# Patient Record
Sex: FEMALE | Race: WHITE | NOT HISPANIC OR LATINO | Employment: FULL TIME | URBAN - NONMETROPOLITAN AREA
[De-identification: names, ages, dates, MRNs, and addresses within clinical notes are randomized per-mention and may not be internally consistent; named-entity substitution may affect disease eponyms.]

---

## 2017-01-24 ENCOUNTER — PROCEDURE VISIT (OUTPATIENT)
Dept: OBSTETRICS AND GYNECOLOGY | Facility: CLINIC | Age: 22
End: 2017-01-24

## 2017-01-24 VITALS
DIASTOLIC BLOOD PRESSURE: 82 MMHG | RESPIRATION RATE: 18 BRPM | BODY MASS INDEX: 47.09 KG/M2 | WEIGHT: 293 LBS | SYSTOLIC BLOOD PRESSURE: 140 MMHG | HEIGHT: 66 IN | HEART RATE: 79 BPM

## 2017-01-24 DIAGNOSIS — Z30.430 ENCOUNTER FOR IUD INSERTION: Primary | ICD-10-CM

## 2017-01-24 PROCEDURE — 58300 INSERT INTRAUTERINE DEVICE: CPT | Performed by: NURSE PRACTITIONER

## 2017-01-24 NOTE — MR AVS SNAPSHOT
Elysia Pike   2017 11:30 AM   Procedure visit    Dept Phone:  [phone]   Encounter #:  [id number]    Provider:  BHARAT Kennedy   Department:  [facility] [place]                Your Full Care Plan              Today's Medication Changes          These changes are accurate as of: 17 12:43 PM.  If you have any questions, ask your nurse or doctor.               Stop taking medication(s)listed here:     doxycycline 100 MG enteric coated tablet   Commonly known as:  DORYX   Stopped by:  BHARAT Kennedy                      Your Updated Medication List          This list is accurate as of: 17 12:43 PM.  Always use your most recent med list.                levothyroxine 100 MCG tablet   Commonly known as:  SYNTHROID   Take 1 tablet by mouth Daily.       metFORMIN 500 MG tablet   Commonly known as:  GLUCOPHAGE   Take 2 tablets by mouth 2 (Two) Times a Day With Meals.               You Were Diagnosed With        Codes Comments    Encounter for insertion of mirena IUD    -  Primary ICD-10-CM: Z30.430  ICD-9-CM: V25.11       Instructions     None    Patient Instructions History      Upcoming Appointments     Visit Type Date Time Department    IN OFFICE PROCEDURE 2017 11:30 AM [facility]    OFFICE VISIT 3/7/2017 10:30 AM [facility]      [facility] Signup     [facility] [employer] allows you to send messages to your doctor, view your test results, renew your prescriptions, schedule appointments, and more. To sign up, go to [employer] and click on the Sign Up Now link in the New User? box. Enter your [employer] Activation Code exactly as it appears below along with the last four digits of your Social Security Number and your Date of Birth () to complete the sign-up process. If you do not sign up before the expiration date, you must request a new code.    [employer] Activation Code:  [email or web address]  Expires: 2/7/2017 12:43 PM    If you have questions, you can email [email or web address] or call [phone] to talk to our MyChart staff. Remember, [employer] is NOT to be used for urgent needs. For medical emergencies, dial 911.               Other Info from Your Visit           Your Appointments     Mar 07, 2017 10:30 AM CST   Office Visit with BHARAT Kennedy   [facility] [place] (--)    [address] [place] [address] Dr [place] [id number] [phone]           Arrive 15 minutes prior to appointment.              Allergies     Aspirin  Other (See Comments)    Eye swelling      Reason for Visit     mirena insertion           Vital Signs     Blood Pressure Pulse Respirations Height Weight Last Menstrual Period    140/82 (BP Location: Right arm, Patient Position: Sitting, Cuff Size: Large Adult) 79 18 66\" (167.6 cm) 327 lb 12.8 oz (149 kg) 01/22/2017 (Exact Date)    Breastfeeding? Body Mass Index Smoking Status             No 52.91 kg/m2 Never Smoker         Problems and Diagnoses Noted     Encounter for insertion of mirena IUD    -  Primary        "

## 2017-01-24 NOTE — PROGRESS NOTES
IUD Insertion    Patient's last menstrual period was 01/22/2017 (exact date).    Date of procedure:  1/24/2017    Risks and benefits discussed? yes  All questions answered? yes  Consents given by The patient  Written consent obtained? Yes, we discussed prior to procedure Mirena vs Yumiko. Pt wants Mirena but if she cannot fit Mirena, pt wants a Yumiko.     Local anesthesia used:  no    Procedure documentation:    After verifying the patient had a low probability of being pregnant and met the criteria for insertion, a sterile speculum has placed and the cervix was cleansed with an antiseptic solution.  Vaginal discharge was scant.  The anterior lip of the cervix was grasped with a tenaculum and the uterine cavity was gently sounded. There was no difficulty passing the sound through the cervix.  Cervical dilation did not need to be performed prior to placing the IUD.  The uterus was midposition and sounded to 5 cms.  Because the pt did not measure 6cm I cannot place Mirena as originally intended. Pt agrees to proceed with Yumiko IUD instead. The Yumiko was then prepared per the manufacturers instructions.    The Yumiko was advanced to a point 2 cms from the fundus and then the arms were released from the sheath.  The device was advanced to the fundus and the device was released fully from the sheath.. The string was cut 3 cms in length.  Bleeding from the cervix was scant. Silver Nitrate used on tenaculum sites.     She tolerated the procedure without any difficulty.    Post procedure instructions: Call if any fever or excessive bleeding or pain. Tylenol prn for cramping.    Follow up needed:  6 weeks for string check    This note was electronically signed.    Jayne Riley, APRN  1/24/2017

## 2017-03-03 ENCOUNTER — OFFICE VISIT (OUTPATIENT)
Dept: OBSTETRICS AND GYNECOLOGY | Facility: CLINIC | Age: 22
End: 2017-03-03

## 2017-03-03 DIAGNOSIS — R10.12 LEFT UPPER QUADRANT PAIN: Primary | ICD-10-CM

## 2017-03-03 DIAGNOSIS — K57.92 DIVERTICULITIS OF INTESTINE WITHOUT PERFORATION OR ABSCESS WITHOUT BLEEDING, UNSPECIFIED PART OF INTESTINAL TRACT: ICD-10-CM

## 2017-03-03 DIAGNOSIS — Z97.5 IUD (INTRAUTERINE DEVICE) IN PLACE: ICD-10-CM

## 2017-03-03 DIAGNOSIS — K59.00 CONSTIPATION, UNSPECIFIED CONSTIPATION TYPE: ICD-10-CM

## 2017-03-03 DIAGNOSIS — K59.00 CONSTIPATION, UNSPECIFIED CONSTIPATION TYPE: Primary | ICD-10-CM

## 2017-03-03 DIAGNOSIS — R10.2 PELVIC PAIN: Primary | ICD-10-CM

## 2017-03-03 DIAGNOSIS — Z30.431 IUD CHECK UP: ICD-10-CM

## 2017-03-03 LAB
ALBUMIN SERPL-MCNC: 3.9 G/DL (ref 3.2–5.5)
ALBUMIN/GLOB SERPL: 1 G/DL (ref 1–3)
ALP SERPL-CCNC: 57 U/L (ref 15–121)
ALT SERPL W P-5'-P-CCNC: 38 U/L (ref 10–60)
AMYLASE SERPL-CCNC: 50 U/L (ref 25–140)
ANION GAP SERPL CALCULATED.3IONS-SCNC: 7 MMOL/L (ref 5–15)
AST SERPL-CCNC: 32 U/L (ref 10–60)
BILIRUB SERPL-MCNC: 0.8 MG/DL (ref 0.2–1)
BUN BLD-MCNC: 10 MG/DL (ref 8–25)
BUN/CREAT SERPL: 11.1 (ref 7–25)
CALCIUM SPEC-SCNC: 9.2 MG/DL (ref 8.4–10.8)
CHLORIDE SERPL-SCNC: 106 MMOL/L (ref 100–112)
CO2 SERPL-SCNC: 28 MMOL/L (ref 20–32)
CREAT BLD-MCNC: 0.9 MG/DL (ref 0.4–1.3)
DEPRECATED RDW RBC AUTO: 43.5 FL (ref 36.4–46.3)
EOSINOPHIL # BLD MANUAL: 0.11 10*3/MM3 (ref 0–0.7)
EOSINOPHIL NFR BLD MANUAL: 1 % (ref 0–7)
ERYTHROCYTE [DISTWIDTH] IN BLOOD BY AUTOMATED COUNT: 13.1 % (ref 11.5–14.5)
ERYTHROCYTE [SEDIMENTATION RATE] IN BLOOD: 40 MM/HR (ref 0–20)
GFR SERPL CREATININE-BSD FRML MDRD: 78 ML/MIN/1.73 (ref 71–165)
GLOBULIN UR ELPH-MCNC: 4.1 GM/DL (ref 2.5–4.6)
GLUCOSE BLD-MCNC: 95 MG/DL (ref 70–100)
HCT VFR BLD AUTO: 40.5 % (ref 35–45)
HGB BLD-MCNC: 13.4 G/DL (ref 12–15.5)
LIPASE SERPL-CCNC: 76 U/L (ref 23–300)
LYMPHOCYTES # BLD MANUAL: 1.94 10*3/MM3 (ref 0.6–4.2)
LYMPHOCYTES NFR BLD MANUAL: 17 % (ref 10–50)
LYMPHOCYTES NFR BLD MANUAL: 9 % (ref 0–12)
MCH RBC QN AUTO: 30.7 PG (ref 26.5–34)
MCHC RBC AUTO-ENTMCNC: 33.1 G/DL (ref 31.4–36)
MCV RBC AUTO: 92.9 FL (ref 80–98)
MONOCYTES # BLD AUTO: 1.03 10*3/MM3 (ref 0–0.9)
NEUTROPHILS # BLD AUTO: 8.33 10*3/MM3 (ref 2–8.6)
NEUTROPHILS NFR BLD MANUAL: 69 % (ref 37–80)
NEUTS BAND NFR BLD MANUAL: 4 % (ref 0–5)
PLATELET # BLD AUTO: 259 10*3/MM3 (ref 150–450)
PMV BLD AUTO: 11.6 FL (ref 8–12)
POTASSIUM BLD-SCNC: 4.1 MMOL/L (ref 3.4–5.4)
PROT SERPL-MCNC: 8 G/DL (ref 6.7–8.2)
RBC # BLD AUTO: 4.36 10*6/MM3 (ref 3.77–5.16)
RBC MORPH BLD: NORMAL
SMALL PLATELETS BLD QL SMEAR: ADEQUATE
SODIUM BLD-SCNC: 141 MMOL/L (ref 134–146)
WBC MORPH BLD: NORMAL
WBC NRBC COR # BLD: 11.41 10*3/MM3 (ref 3.2–9.8)

## 2017-03-03 PROCEDURE — 83690 ASSAY OF LIPASE: CPT | Performed by: NURSE PRACTITIONER

## 2017-03-03 PROCEDURE — 36415 COLL VENOUS BLD VENIPUNCTURE: CPT | Performed by: NURSE PRACTITIONER

## 2017-03-03 PROCEDURE — 80053 COMPREHEN METABOLIC PANEL: CPT | Performed by: NURSE PRACTITIONER

## 2017-03-03 PROCEDURE — 99214 OFFICE O/P EST MOD 30 MIN: CPT | Performed by: NURSE PRACTITIONER

## 2017-03-03 PROCEDURE — 85025 COMPLETE CBC W/AUTO DIFF WBC: CPT | Performed by: NURSE PRACTITIONER

## 2017-03-03 PROCEDURE — 85651 RBC SED RATE NONAUTOMATED: CPT | Performed by: NURSE PRACTITIONER

## 2017-03-03 PROCEDURE — 82150 ASSAY OF AMYLASE: CPT | Performed by: NURSE PRACTITIONER

## 2017-03-03 RX ORDER — POLYETHYLENE GLYCOL 3350 17 G/17G
17 POWDER, FOR SOLUTION ORAL DAILY
Qty: 30 EACH | Refills: 5 | Status: SHIPPED | OUTPATIENT
Start: 2017-03-03 | End: 2018-11-07

## 2017-03-03 RX ORDER — CIPROFLOXACIN 500 MG/1
500 TABLET, FILM COATED ORAL 2 TIMES DAILY
Qty: 14 TABLET | Refills: 0 | Status: SHIPPED | OUTPATIENT
Start: 2017-03-03 | End: 2017-03-07 | Stop reason: HOSPADM

## 2017-03-03 RX ORDER — METRONIDAZOLE 500 MG/1
500 TABLET ORAL 2 TIMES DAILY
Qty: 14 TABLET | Refills: 0 | Status: SHIPPED | OUTPATIENT
Start: 2017-03-03 | End: 2017-03-21 | Stop reason: HOSPADM

## 2017-03-04 ENCOUNTER — APPOINTMENT (OUTPATIENT)
Dept: ULTRASOUND IMAGING | Facility: HOSPITAL | Age: 22
End: 2017-03-04

## 2017-03-04 ENCOUNTER — APPOINTMENT (OUTPATIENT)
Dept: CT IMAGING | Facility: HOSPITAL | Age: 22
End: 2017-03-04

## 2017-03-04 ENCOUNTER — HOSPITAL ENCOUNTER (INPATIENT)
Facility: HOSPITAL | Age: 22
LOS: 3 days | Discharge: HOME | End: 2017-03-07
Attending: EMERGENCY MEDICINE | Admitting: OBSTETRICS & GYNECOLOGY

## 2017-03-04 VITALS
WEIGHT: 293 LBS | SYSTOLIC BLOOD PRESSURE: 138 MMHG | RESPIRATION RATE: 20 BRPM | DIASTOLIC BLOOD PRESSURE: 78 MMHG | HEART RATE: 80 BPM | HEIGHT: 66 IN | BODY MASS INDEX: 47.09 KG/M2

## 2017-03-04 DIAGNOSIS — N73.0 PID (ACUTE PELVIC INFLAMMATORY DISEASE): Primary | ICD-10-CM

## 2017-03-04 DIAGNOSIS — K52.9 COLITIS: ICD-10-CM

## 2017-03-04 LAB
ABO GROUP BLD: NORMAL
ALBUMIN SERPL-MCNC: 4 G/DL (ref 3.4–4.8)
ALBUMIN/GLOB SERPL: 1.2 G/DL (ref 1.1–1.8)
ALP SERPL-CCNC: 65 U/L (ref 38–126)
ALT SERPL W P-5'-P-CCNC: 50 U/L (ref 9–52)
AMORPH URATE CRY URNS QL MICRO: ABNORMAL /HPF
ANION GAP SERPL CALCULATED.3IONS-SCNC: 14 MMOL/L (ref 5–15)
AST SERPL-CCNC: 25 U/L (ref 14–36)
BACTERIA UR QL AUTO: ABNORMAL /HPF
BASOPHILS # BLD AUTO: 0.04 10*3/MM3 (ref 0–0.2)
BASOPHILS NFR BLD AUTO: 0.3 % (ref 0–2)
BILIRUB SERPL-MCNC: 0.8 MG/DL (ref 0.2–1.3)
BILIRUB UR QL STRIP: ABNORMAL
BLD GP AB SCN SERPL QL: NEGATIVE
BUN BLD-MCNC: 12 MG/DL (ref 7–21)
BUN/CREAT SERPL: 13.8 (ref 7–25)
CALCIUM SPEC-SCNC: 8.8 MG/DL (ref 8.4–10.2)
CANDIDA ALBICANS: NEGATIVE
CHLORIDE SERPL-SCNC: 102 MMOL/L (ref 95–110)
CLARITY UR: ABNORMAL
CO2 SERPL-SCNC: 24 MMOL/L (ref 22–31)
COLOR UR: ABNORMAL
CREAT BLD-MCNC: 0.87 MG/DL (ref 0.5–1)
DEPRECATED RDW RBC AUTO: 43.3 FL (ref 36.4–46.3)
EOSINOPHIL # BLD AUTO: 0.08 10*3/MM3 (ref 0–0.7)
EOSINOPHIL NFR BLD AUTO: 0.5 % (ref 0–7)
ERYTHROCYTE [DISTWIDTH] IN BLOOD BY AUTOMATED COUNT: 12.9 % (ref 11.5–14.5)
GARDNERELLA VAGINALIS: NEGATIVE
GFR SERPL CREATININE-BSD FRML MDRD: 81 ML/MIN/1.73 (ref 71–165)
GLOBULIN UR ELPH-MCNC: 3.4 GM/DL (ref 2.3–3.5)
GLUCOSE BLD-MCNC: 131 MG/DL (ref 60–100)
GLUCOSE UR STRIP-MCNC: NEGATIVE MG/DL
HCG SERPL QL: NEGATIVE
HCT VFR BLD AUTO: 37.4 % (ref 35–45)
HGB BLD-MCNC: 12.6 G/DL (ref 12–15.5)
HGB UR QL STRIP.AUTO: NEGATIVE
HYALINE CASTS UR QL AUTO: ABNORMAL /LPF
IMM GRANULOCYTES # BLD: 0.05 10*3/MM3 (ref 0–0.02)
IMM GRANULOCYTES NFR BLD: 0.3 % (ref 0–0.5)
KETONES UR QL STRIP: ABNORMAL
LEUKOCYTE ESTERASE UR QL STRIP.AUTO: ABNORMAL
LIPASE SERPL-CCNC: 66 U/L (ref 23–300)
LYMPHOCYTES # BLD AUTO: 0.94 10*3/MM3 (ref 0.6–4.2)
LYMPHOCYTES NFR BLD AUTO: 5.9 % (ref 10–50)
Lab: NORMAL
MCH RBC QN AUTO: 30.7 PG (ref 26.5–34)
MCHC RBC AUTO-ENTMCNC: 33.7 G/DL (ref 31.4–36)
MCV RBC AUTO: 91.2 FL (ref 80–98)
MONOCYTES # BLD AUTO: 0.84 10*3/MM3 (ref 0–0.9)
MONOCYTES NFR BLD AUTO: 5.3 % (ref 0–12)
MUCOUS THREADS URNS QL MICRO: ABNORMAL /HPF
NEUTROPHILS # BLD AUTO: 14.01 10*3/MM3 (ref 2–8.6)
NEUTROPHILS NFR BLD AUTO: 87.7 % (ref 37–80)
NITRITE UR QL STRIP: POSITIVE
PH UR STRIP.AUTO: 5.5 [PH] (ref 5–9)
PLATELET # BLD AUTO: 226 10*3/MM3 (ref 150–450)
PMV BLD AUTO: 11.4 FL (ref 8–12)
POTASSIUM BLD-SCNC: 4 MMOL/L (ref 3.5–5.1)
PROT SERPL-MCNC: 7.4 G/DL (ref 6.3–8.6)
PROT UR QL STRIP: ABNORMAL
RBC # BLD AUTO: 4.1 10*6/MM3 (ref 3.77–5.16)
RBC # UR: ABNORMAL /HPF
REF LAB TEST METHOD: ABNORMAL
RH BLD: POSITIVE
SODIUM BLD-SCNC: 140 MMOL/L (ref 137–145)
SP GR UR STRIP: 1.02 (ref 1–1.03)
SQUAMOUS #/AREA URNS HPF: ABNORMAL /HPF
TRICHOMONAS VAGINALIS PCR: NEGATIVE
UROBILINOGEN UR QL STRIP: ABNORMAL
WBC NRBC COR # BLD: 15.96 10*3/MM3 (ref 3.2–9.8)
WBC UR QL AUTO: ABNORMAL /HPF
WHOLE BLOOD HOLD SPECIMEN: NORMAL

## 2017-03-04 PROCEDURE — 85025 COMPLETE CBC W/AUTO DIFF WBC: CPT | Performed by: EMERGENCY MEDICINE

## 2017-03-04 PROCEDURE — 87800 DETECT AGNT MULT DNA DIREC: CPT | Performed by: EMERGENCY MEDICINE

## 2017-03-04 PROCEDURE — G0378 HOSPITAL OBSERVATION PER HR: HCPCS

## 2017-03-04 PROCEDURE — 76830 TRANSVAGINAL US NON-OB: CPT

## 2017-03-04 PROCEDURE — 99284 EMERGENCY DEPT VISIT MOD MDM: CPT

## 2017-03-04 PROCEDURE — 87086 URINE CULTURE/COLONY COUNT: CPT | Performed by: EMERGENCY MEDICINE

## 2017-03-04 PROCEDURE — 87481 CANDIDA DNA AMP PROBE: CPT | Performed by: EMERGENCY MEDICINE

## 2017-03-04 PROCEDURE — 87512 GARDNER VAG DNA QUANT: CPT | Performed by: EMERGENCY MEDICINE

## 2017-03-04 PROCEDURE — 87661 TRICHOMONAS VAGINALIS AMPLIF: CPT | Performed by: EMERGENCY MEDICINE

## 2017-03-04 PROCEDURE — 25010000002 ONDANSETRON PER 1 MG: Performed by: EMERGENCY MEDICINE

## 2017-03-04 PROCEDURE — 25010000002 CEFOXITIN: Performed by: EMERGENCY MEDICINE

## 2017-03-04 PROCEDURE — 86850 RBC ANTIBODY SCREEN: CPT | Performed by: EMERGENCY MEDICINE

## 2017-03-04 PROCEDURE — 81001 URINALYSIS AUTO W/SCOPE: CPT | Performed by: EMERGENCY MEDICINE

## 2017-03-04 PROCEDURE — 86900 BLOOD TYPING SEROLOGIC ABO: CPT | Performed by: EMERGENCY MEDICINE

## 2017-03-04 PROCEDURE — 82962 GLUCOSE BLOOD TEST: CPT

## 2017-03-04 PROCEDURE — 74177 CT ABD & PELVIS W/CONTRAST: CPT

## 2017-03-04 PROCEDURE — 25010000002 HYDROMORPHONE PER 4 MG: Performed by: EMERGENCY MEDICINE

## 2017-03-04 PROCEDURE — 83690 ASSAY OF LIPASE: CPT | Performed by: EMERGENCY MEDICINE

## 2017-03-04 PROCEDURE — 80053 COMPREHEN METABOLIC PANEL: CPT | Performed by: EMERGENCY MEDICINE

## 2017-03-04 PROCEDURE — 25010000002 MORPHINE PER 10 MG: Performed by: EMERGENCY MEDICINE

## 2017-03-04 PROCEDURE — 99244 OFF/OP CNSLTJ NEW/EST MOD 40: CPT | Performed by: OBSTETRICS & GYNECOLOGY

## 2017-03-04 PROCEDURE — 86901 BLOOD TYPING SEROLOGIC RH(D): CPT | Performed by: EMERGENCY MEDICINE

## 2017-03-04 PROCEDURE — 84703 CHORIONIC GONADOTROPIN ASSAY: CPT | Performed by: EMERGENCY MEDICINE

## 2017-03-04 PROCEDURE — 25010000002 CEFOXITIN: Performed by: OBSTETRICS & GYNECOLOGY

## 2017-03-04 PROCEDURE — 0 IOPAMIDOL 61 % SOLUTION: Performed by: EMERGENCY MEDICINE

## 2017-03-04 PROCEDURE — 99285 EMERGENCY DEPT VISIT HI MDM: CPT

## 2017-03-04 RX ORDER — METRONIDAZOLE 500 MG/1
500 TABLET ORAL 2 TIMES DAILY
Status: DISCONTINUED | OUTPATIENT
Start: 2017-03-04 | End: 2017-03-07 | Stop reason: HOSPADM

## 2017-03-04 RX ORDER — CEFOXITIN 2 G/1
2 INJECTION, POWDER, FOR SOLUTION INTRAVENOUS EVERY 6 HOURS
Status: DISCONTINUED | OUTPATIENT
Start: 2017-03-04 | End: 2017-03-04 | Stop reason: DRUGHIGH

## 2017-03-04 RX ORDER — ONDANSETRON 2 MG/ML
4 INJECTION INTRAMUSCULAR; INTRAVENOUS ONCE
Status: COMPLETED | OUTPATIENT
Start: 2017-03-04 | End: 2017-03-04

## 2017-03-04 RX ORDER — SODIUM CHLORIDE 9 MG/ML
125 INJECTION, SOLUTION INTRAVENOUS CONTINUOUS
Status: DISCONTINUED | OUTPATIENT
Start: 2017-03-04 | End: 2017-03-07 | Stop reason: HOSPADM

## 2017-03-04 RX ORDER — POLYETHYLENE GLYCOL 3350 17 G/17G
17 POWDER, FOR SOLUTION ORAL DAILY
Status: DISCONTINUED | OUTPATIENT
Start: 2017-03-04 | End: 2017-03-07 | Stop reason: HOSPADM

## 2017-03-04 RX ORDER — ACETAMINOPHEN 325 MG/1
650 TABLET ORAL ONCE
Status: COMPLETED | OUTPATIENT
Start: 2017-03-04 | End: 2017-03-04

## 2017-03-04 RX ORDER — DOXYCYCLINE HYCLATE 100 MG
100 TABLET ORAL EVERY 12 HOURS SCHEDULED
Status: DISCONTINUED | OUTPATIENT
Start: 2017-03-04 | End: 2017-03-07 | Stop reason: HOSPADM

## 2017-03-04 RX ORDER — PROMETHAZINE HYDROCHLORIDE 25 MG/ML
12.5 INJECTION, SOLUTION INTRAMUSCULAR; INTRAVENOUS EVERY 4 HOURS PRN
Status: DISCONTINUED | OUTPATIENT
Start: 2017-03-04 | End: 2017-03-07 | Stop reason: HOSPADM

## 2017-03-04 RX ORDER — MORPHINE SULFATE 4 MG/ML
4 INJECTION, SOLUTION INTRAMUSCULAR; INTRAVENOUS ONCE
Status: COMPLETED | OUTPATIENT
Start: 2017-03-04 | End: 2017-03-04

## 2017-03-04 RX ORDER — OXYCODONE HYDROCHLORIDE AND ACETAMINOPHEN 5; 325 MG/1; MG/1
1 TABLET ORAL EVERY 4 HOURS PRN
Status: DISCONTINUED | OUTPATIENT
Start: 2017-03-04 | End: 2017-03-07 | Stop reason: HOSPADM

## 2017-03-04 RX ORDER — LEVOTHYROXINE SODIUM 0.1 MG/1
100 TABLET ORAL DAILY
Status: DISCONTINUED | OUTPATIENT
Start: 2017-03-04 | End: 2017-03-07 | Stop reason: HOSPADM

## 2017-03-04 RX ORDER — SODIUM CHLORIDE 0.9 % (FLUSH) 0.9 %
10 SYRINGE (ML) INJECTION AS NEEDED
Status: DISCONTINUED | OUTPATIENT
Start: 2017-03-04 | End: 2017-03-07 | Stop reason: HOSPADM

## 2017-03-04 RX ADMIN — DOXYCYCLINE HYCLATE 100 MG: 100 TABLET, FILM COATED ORAL at 20:57

## 2017-03-04 RX ADMIN — HYDROMORPHONE HYDROCHLORIDE 1 MG: 1 INJECTION, SOLUTION INTRAMUSCULAR; INTRAVENOUS; SUBCUTANEOUS at 14:12

## 2017-03-04 RX ADMIN — MORPHINE SULFATE 4 MG: 4 INJECTION, SOLUTION INTRAMUSCULAR; INTRAVENOUS at 11:44

## 2017-03-04 RX ADMIN — CEFOXITIN 2 G: 2 INJECTION, POWDER, FOR SOLUTION INTRAVENOUS at 14:56

## 2017-03-04 RX ADMIN — METFORMIN HYDROCHLORIDE 500 MG: 500 TABLET ORAL at 19:46

## 2017-03-04 RX ADMIN — SODIUM CHLORIDE 125 ML/HR: 9 INJECTION, SOLUTION INTRAVENOUS at 19:52

## 2017-03-04 RX ADMIN — CEFOXITIN 2 G: 2 INJECTION, POWDER, FOR SOLUTION INTRAVENOUS at 20:54

## 2017-03-04 RX ADMIN — SODIUM CHLORIDE 125 ML/HR: 9 INJECTION, SOLUTION INTRAVENOUS at 11:45

## 2017-03-04 RX ADMIN — METRONIDAZOLE 500 MG: 500 TABLET ORAL at 19:29

## 2017-03-04 RX ADMIN — ACETAMINOPHEN 650 MG: 325 TABLET ORAL at 21:24

## 2017-03-04 RX ADMIN — ONDANSETRON 4 MG: 2 INJECTION INTRAMUSCULAR; INTRAVENOUS at 11:44

## 2017-03-04 RX ADMIN — OXYCODONE HYDROCHLORIDE AND ACETAMINOPHEN 1 TABLET: 5; 325 TABLET ORAL at 23:29

## 2017-03-04 RX ADMIN — OXYCODONE HYDROCHLORIDE AND ACETAMINOPHEN 1 TABLET: 5; 325 TABLET ORAL at 19:27

## 2017-03-04 RX ADMIN — DOXYCYCLINE 100 MG: 100 INJECTION, POWDER, LYOPHILIZED, FOR SOLUTION INTRAVENOUS at 16:42

## 2017-03-04 RX ADMIN — IOPAMIDOL 100 ML: 612 INJECTION, SOLUTION INTRAVENOUS at 12:36

## 2017-03-04 NOTE — PROGRESS NOTES
Subjective   Elysia Pike is a 22 y.o. female.     History of Present Illness   Pt presents describing left sided pelvic pain that she believed to be ovarian cyst as pt has hx. TVUS obtained prior to arrival due to scheduling conflicts in the clinic. TVUS confirms IUD placement in the lower uterine segment, right ovarian cyst consistent with the last ultrasound unchanged. None noted on the left side.     Pt describes the pain as sharp, doubled-over at times, began occurring 4 days ago. Left side of abdomen adjacent to umbilicus. Took tylenol a few times with minimal relief of pain. Pt had a BM this AM but has not had a BM in several days prior.  Denies dysuria, N/V, diarrhea.     IUD was placed nearly 6 weeks ago. Did not have pain after placement. Pt is having bleeding episodes approximately every other week. Encouraged that despite placement in the lower uterine segment, I recommend leaving IUD in place as pain does not appear to be GYN in nature, studies show unless symptomatic it should still be effective, and irregular bleeding is very common in the first 3-6 months of Yumiko. Pt verbalizes agreement to plan of care.     The following portions of the patient's history were reviewed and updated as appropriate: allergies, current medications, past family history, past medical history, past social history, past surgical history and problem list.    Review of Systems   Constitutional: Positive for appetite change (decreased). Negative for chills and fever.   Respiratory: Negative.    Cardiovascular: Negative.    Gastrointestinal: Positive for abdominal pain (left sided) and constipation. Negative for diarrhea, nausea and vomiting.   Genitourinary: Positive for menstrual problem (irregular bleeding, Yumiko IUD placed less than 6 weeks ago). Negative for difficulty urinating, dysuria, flank pain, frequency, urgency, vaginal discharge and vaginal pain.   Neurological: Negative for dizziness, syncope and  light-headedness.       Objective   Physical Exam   Constitutional: She is oriented to person, place, and time. She appears well-developed and well-nourished. She has a sickly appearance.   Cardiovascular: Normal rate, regular rhythm and normal heart sounds.    Pulmonary/Chest: Effort normal and breath sounds normal.   Abdominal: Normal appearance. Bowel sounds are decreased. There is tenderness in the left upper quadrant and left lower quadrant. There is no rigidity, no CVA tenderness, no tenderness at McBurney's point and negative Jain's sign.       Genitourinary: Vagina normal and uterus normal. There is no rash, tenderness, lesion or injury on the right labia. There is no rash, tenderness, lesion or injury on the left labia. Cervix exhibits no motion tenderness. Right adnexum displays no mass, no tenderness and no fullness. Left adnexum displays no mass, no tenderness and no fullness.   Genitourinary Comments: Exam limited due to body habitus. IUD strings visualized 2cm outside of cervical os. No shortening or lengthening from placement. No plastic seen or palpated. No pain elicited on bimanual.    Neurological: She is alert and oriented to person, place, and time.   Vitals reviewed.    PROCEDURE: US TRANSVAGINAL     COMPARISON: No comparison     HISTORY: Pelvic pain, IUD in place, R10.2 Pelvic and perineal  pain Z97.5 Presence of (intrauterine) contraceptive device.  LMP: 2/20/2017     FINDINGS: Realtime grayscale and color-flow imaging is obtained  of the pelvis.     The uterus measures 8 x 6 x 4 cm. The endometrium measures 7 mm  in thickness. There is partial visualization of an intrauterine  device and the cervix, which cannot be confirmed in the uterine  fundus on this exam. There are nabothian cysts in the cervix.     The right ovary measures 4 x 2 x 3 cm. There is a 2 cm cyst  adjacent to the right ovary, likely ovarian in origin.  The left ovary measures 3 x 3 x 3 cm.  No suspicious adnexal mass is  seen.     No free fluid visualized.     IMPRESSION:  CONCLUSION:   Partial visualization of an intrauterine device and the cervix,  cannot be confirmed in the uterine fundus, possibly low lying in  an abnormal position and the cervix.     Electronically signed by: Phu Landin MD 3/3/2017 11:50 AM CST  Workstation: [id number]        DATE OF PROCEDURE: 3/3/2017 12:10 PM CST     ACUTE ABDOMEN SERIES     INDICATION FOR PROCEDURE: 22 years -old patient presents for  evaluation of abdominal pain.     COMPARISON: Left upper quadrant abdominal pain.     FINDINGS:      There is no radiographic evidence for pneumoperitoneum.     Three AP views of the abdomen are obtained with the patient  supine and upright. There is no obvious organomegaly, mass, or  dilated bowel. Patient has an IUD. No pathologic calcifications  are visualized.     The visualized heart appears to be enlarged but this may be  projectional. Lung bases are clear.     IMPRESSION:  CONCLUSION: No radiographic evidence of acute intra-abdominal  disease.     Electronically signed by: Marii Lawler MD 3/3/2017 4:06 PM CST  Workstation: [id number]    Lipase   Status:  Final result   Visible to patient:  No (Not Released) Dx:  Left upper quadrant pain Order: [id number]          Ref Range & Units 1d ago       Lipase 23 - 300 U/L 76     Resulting Agency  [facility]          Specimen Collected: 03/03/17 12:07 PM Last Resulted: 03/03/17  6:20 PM                                Sedimentation Rate   Status:  Final result   Visible to patient:  No (Not Released) Dx:  Left upper quadrant pain Order: [id number]          Ref Range & Units 1d ago       Sed Rate 0 - 20 mm/hr 40 (H)     Resulting Agency  [facility]          Specimen Collected: 03/03/17 12:07 PM Last Resulted: 03/03/17  1:18 PM                                CBC Auto Differential   Status:  Final result   Visible to patient:  No (Not Released) Dx:  Left upper quadrant pain Order: [id number] - Part of Panel Order  [id number]             Ref Range & Units 1d ago  (3/3/17) 9mo ago  (5/27/16) 9mo ago  (5/23/16)      WBC 3.20 - 9.80 10*3/mm3 11.41 (H) 7.1R 8.5R      RBC 3.77 - 5.16 10*6/mm3 4.36 3.75 (L)R 3.90R      Hemoglobin 12.0 - 15.5 g/dL 13.4 11.9 (L)R 12.2R      Hematocrit 35.0 - 45.0 % 40.5 35.7 36.7      MCV 80.0 - 98.0 fL 92.9 95.2R 94.1R      MCH 26.5 - 34.0 pg 30.7 31.7R 31.3R      MCHC 31.4 - 36.0 g/dL 33.1 33.3R 33.2R      RDW 11.5 - 14.5 % 13.1 13.7 13.2      RDW-SD 36.4 - 46.3 fl 43.5        MPV 8.0 - 12.0 fL 11.6 10.9R 11.6R      Platelets 150 - 450 10*3/mm3 259 256R 253R     Resulting Agency  [facility]          Specimen Collected: 03/03/17 12:07 PM Last Resulted: 03/03/17 12:54 PM                R=Reference range differs from displayed range                     Manual Differential   Status:  Final result   Visible to patient:  No (Not Released) Dx:  Left upper quadrant pain Order: [id number] - Part of Panel Order [id number]             Ref Range & Units 1d ago  (3/3/17) 9mo ago  (5/27/16) 9mo ago  (5/23/16)      Neutrophil % 37.0 - 80.0 % 69.0 61.7 62.2      Lymphocyte % 10.0 - 50.0 % 17.0 27.4 25.7      Monocyte % 0.0 - 12.0 % 9.0 8.0 8.8      Eosinophil % 0.0 - 7.0 % 1.0 2.3 2.9      Bands %  0.0 - 5.0 % 4.0        Neutrophils Absolute 2.00 - 8.60 10*3/mm3 8.33        Lymphocytes Absolute 0.60 - 4.20 10*3/mm3 1.94        Monocytes Absolute 0.00 - 0.90 10*3/mm3 1.03 (H)        Eosinophils Absolute 0.00 - 0.70 10*3/mm3 0.11        RBC Morphology Normal Normal        WBC Morphology Normal Normal        Platelet Estimate Normal Adequate       Resulting Agency  [facility]          Specimen Collected: 03/03/17 12:07 PM Last Resulted: 03/03/17 12:54 PM                                 Amylase   Status:  Final result   Visible to patient:  No (Not Released) Dx:  Left upper quadrant pain Order: [id number]          Ref Range & Units 1d ago       Amylase 25 - 140 U/L 50      Resulting Agency  [facility]          Specimen Collected: 03/03/17 12:07 PM Last Resulted: 03/03/17 12:41 PM                                 Comprehensive Metabolic Panel   Status:  Final result   Visible to patient:  No (Not Released) Dx:  Left upper quadrant pain Order: [id number]             Ref Range & Units 1d ago   9mo ago   10mo ago        Glucose 70 - 100 mg/dL 95 99R 102 (H)R      BUN 8 - 25 mg/dL 10  15R      Creatinine 0.40 - 1.30 mg/dL 0.90  0.8R      Sodium 134 - 146 mmol/L 141  138.0      Potassium 3.4 - 5.4 mmol/L 4.1  3.8      Chloride 100 - 112 mmol/L 106  102.0R      CO2 20.0 - 32.0 mmol/L 28.0  28.0      Calcium 8.4 - 10.8 mg/dL 9.2  9.1R      Total Protein 6.7 - 8.2 g/dL 8.0  7.5R      Albumin 3.20 - 5.50 g/dL 3.90  4.1R      ALT (SGPT) 10 - 60 U/L 38  48      AST (SGOT) 10 - 60 U/L 32  32      Alkaline Phosphatase 15 - 121 U/L 57  57      Total Bilirubin 0.2 - 1.0 mg/dL 0.8  0.9R      eGFR Non African Amer 71 - 165 mL/min/1.73 78        Globulin 2.5 - 4.6 gm/dL 4.1        A/G Ratio 1.0 - 3.0 g/dL 1.0        BUN/Creatinine Ratio 7.0 - 25.0 11.1        Anion Gap 5.0 - 15.0 mmol/L 7.0  8.0     Resulting Agency  [facility]          Specimen Collected: 03/03/17 12:07 PM Last Resulted: 03/03/17 12:41 PM                 Assessment/Plan   Elysia was seen today for follow-up.    Diagnoses and all orders for this visit:    Left upper quadrant pain  -     XR Abdomen Flat & Upright  -     CBC & Differential  -     Amylase  -     Lipase  -     Comprehensive Metabolic Panel  -     Sedimentation Rate  -     CBC Auto Differential  -     Manual Differential; Future  -     Manual Differential    Constipation, unspecified constipation type    IUD check up       I discussed findings with her PCP NARENDRA Hackett. Labs and Xray ordered. PCP and I evaluated findings together. PCPprescribed cipro, flagyl, and miralax and discussed plan of care with pt. See Orders only note prn.     Encouraged to  continue IUD until at least 3-6 months as bleeding typically lessens with time. If bleeding persists, worsens, or becomes heavy, RTC sooner.

## 2017-03-04 NOTE — ED PROVIDER NOTES
Subjective   HPI Comments: 23yo female pmh significant hypthyroid/dm2/obesity presents ED /co 5d hx progressive LLQ/L pelvic pain 'sharp/colicky'/radiating thru to back/assoc nausea, vomiting/exac movement/neg relieve factors.    Patient is a 22 y.o. female presenting with abdominal pain.   Abdominal Pain   Pain location:  LLQ  Pain quality: aching and sharp    Pain radiates to:  Back  Pain severity:  Severe  Onset quality:  Gradual  Duration:  5 days  Timing:  Constant  Progression:  Worsening  Chronicity:  New  Relieved by:  Nothing  Worsened by:  Movement  Ineffective treatments:  None tried  Associated symptoms: nausea and vomiting    Associated symptoms: no chest pain, no chills, no cough, no diarrhea, no fever, no shortness of breath, no vaginal bleeding and no vaginal discharge        Review of Systems   Constitutional: Negative for chills and fever.   Respiratory: Negative for cough and shortness of breath.    Cardiovascular: Negative for chest pain.   Gastrointestinal: Positive for abdominal pain, nausea and vomiting. Negative for diarrhea.   Genitourinary: Negative for vaginal bleeding and vaginal discharge.   Musculoskeletal: Positive for back pain.       Past Medical History   Diagnosis Date   • Acne    • Acquired hypothyroidism    • Acute pharyngitis    • Amenorrhea    • Carbuncle of groin    • Excessive and frequent menstruation with irregular cycle    • FH: blood disorder    • Hidradenitis suppurativa    • Hirsutism    • Hypothyroidism    • Irregular periods    • Metabolic syndrome X    • Obesity    • Unspecified vitamin D deficiency        Allergies   Allergen Reactions   • Aspirin Other (See Comments)     Eye swelling         Past Surgical History   Procedure Laterality Date   • Injection of medication  02/11/2016     Toradol (1)         Family History   Problem Relation Age of Onset   • No Known Problems Other        Social History     Social History   • Marital status: Single     Spouse name: N/A    • Number of children: N/A   • Years of education: N/A     Social History Main Topics   • Smoking status: Never Smoker   • Smokeless tobacco: Never Used   • Alcohol use Yes      Comment: occasionally   • Drug use: No   • Sexual activity: Yes     Partners: Male     Birth control/ protection: IUD     Other Topics Concern   • None     Social History Narrative           Objective   Physical Exam   Constitutional: She appears well-developed and well-nourished.   HENT:   Head: Normocephalic and atraumatic.   Mouth/Throat: Oropharynx is clear and moist.   Eyes: Pupils are equal, round, and reactive to light.   Neck: Neck supple. No JVD present. No tracheal deviation present.   Cardiovascular: Normal rate, regular rhythm, normal heart sounds and intact distal pulses.  Exam reveals no gallop and no friction rub.    No murmur heard.  Pulmonary/Chest: Effort normal and breath sounds normal.   Abdominal: Soft. Normal appearance and bowel sounds are normal. There is tenderness in the left lower quadrant. There is rebound and guarding. There is no tenderness at McBurney's point and negative Jain's sign.   Genitourinary: Vagina normal. Pelvic exam was performed with patient supine. Uterus is tender. Cervix exhibits motion tenderness. Cervix exhibits no discharge. Left adnexum displays tenderness.   Genitourinary Comments: IUD string protruding from cervical os   Lymphadenopathy:     She has no cervical adenopathy.   Skin: Skin is warm and dry.   Nursing note and vitals reviewed.      Procedures         ED Course  ED Course   Comment By Time   D/w Dr. Park, GYN. Will see in ED. Jose Guardado MD 03/04 1630   D/w Dr. Mckeon; will see in consult. Jose Guardado MD 03/04 7021      Labs Reviewed   COMPREHENSIVE METABOLIC PANEL - Abnormal; Notable for the following:        Result Value    Glucose 131 (*)     All other components within normal limits   URINALYSIS W/ CULTURE IF INDICATED - Abnormal; Notable for the following:      Appearance, UA Cloudy (*)     Ketones, UA 15 mg/dL (1+) (*)     Bilirubin, UA Small (1+) (*)     Protein, UA 30 mg/dL (1+) (*)     Leuk Esterase, UA Trace (*)     Nitrite, UA Positive (*)     All other components within normal limits   CBC WITH AUTO DIFFERENTIAL - Abnormal; Notable for the following:     WBC 15.96 (*)     Neutrophil % 87.7 (*)     Lymphocyte % 5.9 (*)     Neutrophils, Absolute 14.01 (*)     Immature Grans, Absolute 0.05 (*)     All other components within normal limits   URINALYSIS, MICROSCOPIC ONLY - Abnormal; Notable for the following:     RBC, UA 0-2 (*)     Bacteria, UA 1+ (*)     Squamous Epithelial Cells, UA 3-5 (*)     Mucus, UA Small/1+ (*)     All other components within normal limits   LIPASE - Normal   HCG, SERUM, QUALITATIVE - Normal   VAGINITIS/VAGINOSIS DNA PROBE   URINE CULTURE   C.TRACHOMATIS/N. GONORRHOEAE PCR   RAINBOW DRAW    Narrative:     The following orders were created for panel order [place] Draw.  Procedure                               Abnormality         Status                     ---------                               -----------         ------                     Light Blue Top[[id number]]                                    Final result                 Please view results for these tests on the individual orders.   TYPE AND SCREEN   PREVIOUS HISTORY   CBC AND DIFFERENTIAL    Narrative:     The following orders were created for panel order CBC & Differential.  Procedure                               Abnormality         Status                     ---------                               -----------         ------                     CBC Auto Differential[[id number]]         Abnormal            Final result                 Please view results for these tests on the individual orders.   LIGHT BLUE TOP   Xr Abdomen Flat & Upright    Result Date: 3/3/2017  Narrative: Patient Name:  SITA RODRIGUES Patient ID:  [id number] Ordering:  MAYA BALLESTEROS Attending:  MAYA  FAVIAN Referring:  MAYA BALLESTEROS ------------------------------------------------ DATE OF PROCEDURE:  3/3/2017 12:10 PM CST ACUTE ABDOMEN SERIES INDICATION FOR PROCEDURE:  22 years -old patient presents for evaluation of abdominal pain. COMPARISON:  Left upper quadrant abdominal pain. FINDINGS:  There is no radiographic evidence for pneumoperitoneum. Three AP views of the abdomen are obtained with the patient supine and upright. There is no obvious organomegaly, mass, or dilated bowel. Patient has an IUD. No pathologic calcifications are visualized. The visualized heart appears to be enlarged but this may be projectional. Lung bases are clear.     Impression: CONCLUSION: No radiographic evidence of acute intra-abdominal disease. Electronically signed by:  Marii Lawler MD  3/3/2017 4:06 PM CST Workstation: [employer] Non-ob Transvaginal    Result Date: 3/4/2017  Narrative: Patient Name:  SITA RODRIGUES Patient ID:  [id number] Ordering:  MITZI YOUNG Attending:  MITZI YOUNG Referring:  MITZI YOUNG ------------------------------------------------ DATE OF EXAM: 3/4/2017 2:40 PM CST PROCEDURE: US TRANSVAGINAL INDICATION FOR PROCEDURE: 22 years old patient presents for evaluation of abdominal pain.  ICD 10 codes:  N73.0 and K52.9. TECHNIQUE: Multiple static grayscale images are obtained transvaginally. COMPARISON:  Pelvic ultrasound dated March 3, 2017 and CT the abdomen and pelvis dated March 4, 2017.. FINDINGS: The uterus has a grossly normal appearance and appears anteverted. There are no myometrial masses. Uterus measures 7.4 x 3 0.9, 4.8[ cm.  Endometrium appears hyperechoic and measures 5.4 mm. mm in thickness.   There are multiple nabothian cysts. Neither ovary was visualized. The ovaries are visualized previously on the previous ultrasound. There is apparent abnormal left fallopian tube. Moderate amount of exudative pelvic fluid is present.     Impression: 1.  IUD is visualized  within the lower uterine segment and cervix. 2.  Exudative pelvic fluid maybe related to pus or hemorrhage. 3.  Ovaries are not visualized on the current study but were visualized on previous studies. 4.  Questionable abnormal left fallopian tube. Electronically signed by:  Marii Lawler MD  3/4/2017 3:27 PM CST Workstation: [employer]     Non-ob Transvaginal    Result Date: 3/3/2017  Narrative: [facility] Patient Name: SITA RODRIGUES ORDERING: MAYA BALLESTEROS ATTENDING: MAYA BALLESTEROS REFERRING: MAYA BALLESTEROS -----------------------    PROCEDURE: US TRANSVAGINAL COMPARISON: No comparison HISTORY: Pelvic pain, IUD in place, R10.2 Pelvic and perineal pain Z97.5 Presence of (intrauterine) contraceptive device. LMP: 2/20/2017 FINDINGS: Realtime grayscale and color-flow imaging is obtained of the pelvis. The uterus measures 8 x 6 x 4 cm. The endometrium measures 7 mm in thickness. There is partial visualization of an intrauterine device and the cervix, which cannot be confirmed in the uterine fundus on this exam. There are nabothian cysts in the cervix. The right ovary measures 4 x 2 x 3 cm. There is a 2 cm cyst adjacent to the right ovary, likely ovarian in origin. The left ovary measures 3 x 3 x 3 cm. No suspicious adnexal mass is seen. No free fluid visualized.     Impression: CONCLUSION:  Partial visualization of an intrauterine device and the cervix, cannot be confirmed in the uterine fundus, possibly low lying in an abnormal position and the cervix. Electronically signed by:  Phu Landin MD  3/3/2017 11:50 AM CST Workstation: [id number]    Ct Abdomen Pelvis With Contrast    Result Date: 3/4/2017  Narrative: Patient Name:  SITA RODRIGUES Patient ID:  [id number] Ordering:  MITZI YOUNG Attending:  MITZI YOUNG Referring:  MITZI YOUNG ------------------------------------------------ DATE OF PROCEDURE:  3/4/2017 12:31 PM CST PROCEDURE: CT ABDOMEN PELVIS WITH IV  CONTRAST INDICATION FOR PROCEDURE:   22 years -old patient presents for evaluation of lower abdominal pain. TECHNIQUE: Contiguous axial images were obtained from lung bases to the proximal thighs after intravenous administration of 100 mL of Isovue-300.   Oral contrast was also administered. Multiplanar reformations are submitted for interpretation. Dose length product is 3209.9.  Images were acquired in accordance with the principles of ALARA (as low as reasonably allowable). COMPARISON:  Pelvic ultrasound dated March 3, 2017. FINDINGS: What is seen of the chest wall has a normal appearance. What is seen of the heart has a normal appearance without obvious pericardial effusion. Lung bases are clear. No pleural effusions are visible. The liver has diffusely decreased attenuation and is enlarged without obvious mass or dilated intrahepatic biliary ducts. The liver measures approximately 23.7 cm in greatest cephalocaudal dimension The gallbladder is present. The spleen has a normal appearance. Both adrenal glands are within normal limits. The pancreas has a normal appearance. Both kidneys have a normal appearance without obvious perinephric fluid or hydronephrosis. There is no evidence for hydroureter or radiopaque ureteral calculi. The urinary bladder has a normal appearance. Uterus has a normal appearance. The IUD appears to be within the lower uterine segment and cervix. There is a small amount of pelvic fluid. There may be some inflammation in the region of the left adnexa. There is a cystic area near the right ovary within the pelvis measuring 3.2 cm in size. This is also identified on the recent ultrasound. The distal esophagus has a normal appearance. The stomach has a normal appearance. There is no obvious dilated bowel, ascites or pneumoperitoneum.  The small bowel has a normal appearance. Stool is visualized throughout the colon.  There is diffuse abnormal thickening of the walls of the descending colon and  sigmoid colon suggesting sequela of a colitis. colonic diverticula are visualized.  Normal appendix is visualized. Abdominal aorta has a normal tapered appearance. The inferior vena cava has a normal appearance. The abdominal wall has a normal appearance. Imaged thoracic and lumbar vertebral bodies have normal height, alignment and appearance. Intervertebral disc spaces are within normal limits.  Bony pelvis has a normal CT appearance.     Impression: 1.  Acute inflammatory changes within the pelvis, possibly secondary to PID. 2.  Abnormal thickening of the walls of the descending colon and sigmoid colon suggesting sequela of colitis. 3.  Hepatomegaly and hepatic steatosis. 4.  Low-lying IUD with the proximal end within the cervix. Electronically signed by:  Marii Lawler MD  3/4/2017 1:09 PM [facility] Workstation: [facility]    Final diagnoses:   PID (acute pelvic inflammatory disease)   Colitis            Jose Guardado MD  03/04/17 0765

## 2017-03-04 NOTE — H&P
Admit H&P    Patient Name: Elysia Pike  : 1995  MRN: [id number]  Date of Service: 3/4/2017  Referring Provider: Geo    ID: 22 y.o.      Chief complaint: Abdominal pain, PID  HPI: Patient presents today for abominal pain which has been ongoing x 5 days and became worse today. I was called to see patient as she had a + chandalier's sign and imaging findings consistent with PID. Patient had Yumiko IUD placed in January for contraception and in hopes of decreasing her bleeding. Imaging yesterday and today shows a Yumiko IUD which is placed in the lower uterine segment/cervix. Potential inflammation/swelling of left fallopian tube and inflammation in the area of her descending colon with diverticula.    REVIEW OF SYSTEMS  abdominal pain in the left upper quadrant and left lower quadrant, nausea with vomiting for 1 days and Constipation. Constipation had been relieved yesterday with magnesium citrate. Denies urinary complaints          OB HISTORY    OB History      Para Term  AB TAB SAB Ectopic Multiple Living    0 0 0 0 0 0 0 0 0 0        PAST MEDICAL HISTORY    Past Medical History   Diagnosis Date   • Acne    • Acquired hypothyroidism    • Acute pharyngitis    • Amenorrhea    • Carbuncle of groin    • Excessive and frequent menstruation with irregular cycle    • FH: blood disorder    • Hidradenitis suppurativa    • Hirsutism    • Hypothyroidism    • Irregular periods    • Metabolic syndrome X    • Obesity    • Unspecified vitamin D deficiency      PAST SURGICAL HISTORY     has a past surgical history that includes Injection of Medication (2016).  CURRENT MEDICATIONS    No current facility-administered medications on file prior to encounter.      Current Outpatient Prescriptions on File Prior to Encounter   Medication Sig Dispense Refill   • ciprofloxacin (CIPRO) 500 MG tablet Take 1 tablet by mouth 2 (Two) Times a Day for 7 days. 14 tablet 0   • levothyroxine (SYNTHROID) 100  MCG tablet Take 1 tablet by mouth Daily. 90 tablet 3   • metFORMIN (GLUCOPHAGE) 500 MG tablet Take 2 tablets by mouth 2 (Two) Times a Day With Meals. 360 tablet 3   • metroNIDAZOLE (FLAGYL) 500 MG tablet Take 1 tablet by mouth 2 (Two) Times a Day. 14 tablet 0   • polyethylene glycol (MIRALAX) packet Take 17 g by mouth Daily. 30 each 5     ALLERGIES    Aspirin  SOCIAL HISTORY    History   Smoking Status   • Never Smoker   Smokeless Tobacco   • Never Used     History   Alcohol Use   • Yes     Comment: occasionally     History   Drug Use No     FAMILY HISTORY  The patient has a family history of    PHYSICAL EXAMINATION    Vital Signs Range for the last 24 hours  Temperature: Temp:  [98.1 °F (36.7 °C)] 98.1 °F (36.7 °C)   Temp Source: Temp src: Oral   BP: BP: (129-145)/(68-82) 129/68   Pulse: Heart Rate:  [76-92] 92   Respirations: Resp:  [16-23] 18   Weight: (!) 320 lb (145 kg)     Constitutional:  Well developed, well nourished, no acute distress, well-groomed. She received pain medication prior to my arrival.  Respiratory:  Lungs are clear to auscultation bilaterally, normal breath sounds.   Cardiovascular:  Normal rate and rhythm, no murmurs.   Gastrointestinal:  Soft, TTP LLQ > LUQ  Neurologic:  Alert & oriented x 3,  no focal deficits noted.   Psychiatric:  Speech and behavior appropriate.   Extremities: no cyanosis, clubbing or edema, no evidence of DVT.  Pelvic: chaperone present for entire exam. + chandalier sign, Yumiko is palpable at external cervical os, IUD removed at patient request. Patient tolerated removal of her IUD well. Normal external genetalia. Moist pink vaginal mucosa. No palpable adnexal mass. Exam is partially limited secondary to patient body habitus.            ASSESSMENT/PLAN:  22 y.o. female  with abdominal pain, PID    1. Admit for observation  2. IV Abx/hydration  3. Supportive care  4. GI consulted via ER for possible colitis/diverticular dz      Nacho Park MD   3/4/2017   [unfilled]

## 2017-03-05 LAB
BACTERIA SPEC AEROBE CULT: NORMAL
C TRACH RRNA CVX QL NAA+PROBE: NOT DETECTED
N GONORRHOEA RRNA SPEC QL NAA+PROBE: NOT DETECTED

## 2017-03-05 PROCEDURE — G0378 HOSPITAL OBSERVATION PER HR: HCPCS

## 2017-03-05 PROCEDURE — 25010000002 CEFOXITIN: Performed by: OBSTETRICS & GYNECOLOGY

## 2017-03-05 RX ADMIN — CEFOXITIN 2 G: 2 INJECTION, POWDER, FOR SOLUTION INTRAVENOUS at 08:42

## 2017-03-05 RX ADMIN — OXYCODONE HYDROCHLORIDE AND ACETAMINOPHEN 1 TABLET: 5; 325 TABLET ORAL at 17:42

## 2017-03-05 RX ADMIN — METFORMIN HYDROCHLORIDE 500 MG: 500 TABLET ORAL at 07:38

## 2017-03-05 RX ADMIN — CEFOXITIN 2 G: 2 INJECTION, POWDER, FOR SOLUTION INTRAVENOUS at 02:59

## 2017-03-05 RX ADMIN — OXYCODONE HYDROCHLORIDE AND ACETAMINOPHEN 1 TABLET: 5; 325 TABLET ORAL at 04:51

## 2017-03-05 RX ADMIN — SODIUM CHLORIDE 125 ML/HR: 9 INJECTION, SOLUTION INTRAVENOUS at 03:01

## 2017-03-05 RX ADMIN — Medication 10 ML: at 16:22

## 2017-03-05 RX ADMIN — METRONIDAZOLE 500 MG: 500 TABLET ORAL at 08:26

## 2017-03-05 RX ADMIN — POLYETHYLENE GLYCOL 3350 17 G: 17 POWDER, FOR SOLUTION ORAL at 10:52

## 2017-03-05 RX ADMIN — Medication 10 ML: at 09:47

## 2017-03-05 RX ADMIN — DOXYCYCLINE HYCLATE 100 MG: 100 TABLET, FILM COATED ORAL at 08:26

## 2017-03-05 RX ADMIN — Medication 10 ML: at 21:40

## 2017-03-05 RX ADMIN — LEVOTHYROXINE SODIUM 100 MCG: 100 TABLET ORAL at 07:48

## 2017-03-05 RX ADMIN — METRONIDAZOLE 500 MG: 500 TABLET ORAL at 17:42

## 2017-03-05 RX ADMIN — OXYCODONE HYDROCHLORIDE AND ACETAMINOPHEN 1 TABLET: 5; 325 TABLET ORAL at 13:56

## 2017-03-05 RX ADMIN — CEFOXITIN 2 G: 2 INJECTION, POWDER, FOR SOLUTION INTRAVENOUS at 21:51

## 2017-03-05 RX ADMIN — DOXYCYCLINE HYCLATE 100 MG: 100 TABLET, FILM COATED ORAL at 21:41

## 2017-03-05 RX ADMIN — CEFOXITIN 2 G: 2 INJECTION, POWDER, FOR SOLUTION INTRAVENOUS at 15:09

## 2017-03-05 RX ADMIN — OXYCODONE HYDROCHLORIDE AND ACETAMINOPHEN 1 TABLET: 5; 325 TABLET ORAL at 22:02

## 2017-03-05 RX ADMIN — METFORMIN HYDROCHLORIDE 500 MG: 500 TABLET ORAL at 17:42

## 2017-03-05 RX ADMIN — OXYCODONE HYDROCHLORIDE AND ACETAMINOPHEN 1 TABLET: 5; 325 TABLET ORAL at 08:29

## 2017-03-05 NOTE — PLAN OF CARE
Problem: Patient Care Overview (Adult)  Goal: Plan of Care Review  Outcome: Ongoing (interventions implemented as appropriate)    03/05/17 0650   Coping/Psychosocial Response Interventions   Plan Of Care Reviewed With patient   Patient Care Overview   Progress improving   Outcome Evaluation   Outcome Summary/Follow up Plan pain controlled, low grade temp during the night, voids, ambulates        Goal: Adult Individualization and Mutuality  Outcome: Ongoing (interventions implemented as appropriate)  Goal: Discharge Needs Assessment  Outcome: Ongoing (interventions implemented as appropriate)    Problem: Pain, Acute (Adult)  Goal: Identify Related Risk Factors and Signs and Symptoms  Outcome: Ongoing (interventions implemented as appropriate)  Goal: Acceptable Pain Control/Comfort Level  Outcome: Ongoing (interventions implemented as appropriate)

## 2017-03-05 NOTE — PROGRESS NOTES
[facility]  Elysia Rodrigues  : 1995  MRN: [id number]  CSN: [id number]    Hospital Day: 2  Subjective   Patient is still having some abdominal pain. This seems to be worse on the right than the left. She tolerated p.o diet with no nausea or vomiting. She denies cough or shortness of air. She is not having any chest pain. She denies fever or chills. She is having some dysuria. She denies any vaginal discharge or bleeding.      Objective     Min/max vitals past 24 hours:   Temp  Min: 98.1 °F (36.7 °C)  Max: 100.8 °F (38.2 °C)  BP  Min: 129/68  Max: 145/79  Pulse  Min: 76  Max: 99  Pulse  Min: 76  Max: 99             General: well developed; well nourished  no acute distress   Abdomen: no umbilical or inginual hernias are present  no hepato-splenomegaly   Tenderness in right and left lower quadrant. Worse on the right.   Pelvic: Not performed   Ext: Calves NT     Lab Results   Component Value Date    WBC 15.96 (H) 2017    HGB 12.6 2017    HCT 37.4 2017    MCV 91.2 2017     2017     2017    K 4.0 2017     2017    CO2 24.0 2017    BUN 12 2017    CREATININE 0.87 2017    GLUCOSE 131 (H) 2017    ALBUMIN 4.00 2017    CALCIUM 8.8 2017    AST 25 2017    ALT 50 2017    BILITOT 0.8 2017    AMYLASE 50 2017    LIPASE 66 2017     Imaging Results (last 24 hours)     Procedure Component Value Units Date/Time    CT Abdomen Pelvis With Contrast [[id number]] Collected:  17 1257     Updated:  17 1310    Narrative:       Patient Name:  ELYSIA RODRIGUES  Patient ID:  [id number]   Ordering:  MITZI YOUNG  Attending:  MITZI YOUNG  Referring:  MITZI YOUNG  ------------------------------------------------  DATE OF PROCEDURE:  3/4/2017 12:31 PM CST    PROCEDURE: CT ABDOMEN PELVIS WITH IV CONTRAST    INDICATION FOR PROCEDURE:   22 years -old patient presents for  evaluation of lower  abdominal pain.    TECHNIQUE: Contiguous axial images were obtained from lung bases  to the proximal thighs after intravenous administration of 100 mL  of Isovue-300.   Oral contrast was also administered. Multiplanar  reformations are submitted for interpretation. Dose length  product is 3209.9.  Images were acquired in accordance with the  principles of ALARA (as low as reasonably allowable).    COMPARISON:  Pelvic ultrasound dated March 3, 2017.    FINDINGS: What is seen of the chest wall has a normal appearance.  What is seen of the heart has a normal appearance without obvious  pericardial effusion. Lung bases are clear. No pleural effusions  are visible.    The liver has diffusely decreased attenuation and is enlarged  without obvious mass or dilated intrahepatic biliary ducts. The  liver measures approximately 23.7 cm in greatest cephalocaudal  dimension The gallbladder is present. The spleen has a normal  appearance. Both adrenal glands are within normal limits. The  pancreas has a normal appearance.     Both kidneys have a normal appearance without obvious perinephric  fluid or hydronephrosis. There is no evidence for hydroureter or  radiopaque ureteral calculi.     The urinary bladder has a normal appearance. Uterus has a normal  appearance. The IUD appears to be within the lower uterine  segment and cervix. There is a small amount of pelvic fluid.  There may be some inflammation in the region of the left adnexa.  There is a cystic area near the right ovary within the pelvis  measuring 3.2 cm in size. This is also identified on the recent  ultrasound.    The distal esophagus has a normal appearance. The stomach has a  normal appearance. There is no obvious dilated bowel, ascites or  pneumoperitoneum.  The small bowel has a normal appearance.   Stool is visualized throughout the colon.  There is diffuse  abnormal thickening of the walls of the descending colon and  sigmoid colon suggesting sequela of a  colitis. colonic  diverticula are visualized.  Normal appendix is visualized.    Abdominal aorta has a normal tapered appearance. The inferior  vena cava has a normal appearance.    The abdominal wall has a normal appearance.    Imaged thoracic and lumbar vertebral bodies have normal height,  alignment and appearance. Intervertebral disc spaces are within  normal limits.  Bony pelvis has a normal CT appearance.      Impression:         1.  Acute inflammatory changes within the pelvis, possibly  secondary to PID.   2.  Abnormal thickening of the walls of the descending colon and  sigmoid colon suggesting sequela of colitis.  3.  Hepatomegaly and hepatic steatosis.  4.  Low-lying IUD with the proximal end within the cervix.    Electronically signed by:  Marii Lawler MD  3/4/2017 1:09 PM CST  Workstation: [employer] Non-ob Transvaginal [[id number]] Collected:  03/04/17 1522     Updated:  03/04/17 1528    Narrative:       Patient Name:  SITA RODRIGUES  Patient ID:  [id number]   Ordering:  MITZI YOUNG  Attending:  MITZI YOUNG  Referring:  MITZI YOUNG  ------------------------------------------------    DATE OF EXAM: 3/4/2017 2:40 PM CST    PROCEDURE: US TRANSVAGINAL    INDICATION FOR PROCEDURE: 22 years old patient presents for  evaluation of abdominal pain.  ICD 10 codes:  N73.0 and K52.9.    TECHNIQUE: Multiple static grayscale images are obtained  transvaginally.    COMPARISON:  Pelvic ultrasound dated March 3, 2017 and CT the  abdomen and pelvis dated March 4, 2017..    FINDINGS: The uterus has a grossly normal appearance and appears  anteverted. There are no myometrial masses. Uterus measures 7.4 x  3 0.9, 4.8[ cm.  Endometrium appears hyperechoic and measures 5.4  mm. mm in thickness.   There are multiple nabothian cysts.    Neither ovary was visualized. The ovaries are visualized  previously on the previous ultrasound. There is apparent abnormal  left fallopian tube.    Moderate amount of exudative  pelvic fluid is present.      Impression:         1.  IUD is visualized within the lower uterine segment and  cervix.  2.  Exudative pelvic fluid maybe related to pus or hemorrhage.  3.  Ovaries are not visualized on the current study but were  visualized on previous studies.  4.  Questionable abnormal left fallopian tube.    Electronically signed by:  Marii Lawler MD  3/4/2017 3:27 PM CST  Workstation: [facility]           Assessment    22 y.o female  with abdominal pain   1.) PID    2.) Possible colitis          Plan   1. Will continue Cefoxin, Doxycycline, and flagyl  2. Will continue pain control with Percocet prn.  3. Gi has been consulted and will evaluate the patient.          This document has been electronically signed by Nacho Corrales MD on 2017 6:56 AM

## 2017-03-06 ENCOUNTER — APPOINTMENT (OUTPATIENT)
Dept: CT IMAGING | Facility: HOSPITAL | Age: 22
End: 2017-03-06

## 2017-03-06 LAB
BASOPHILS # BLD AUTO: 0.03 10*3/MM3 (ref 0–0.2)
BASOPHILS NFR BLD AUTO: 0.2 % (ref 0–2)
DEPRECATED RDW RBC AUTO: 45.1 FL (ref 36.4–46.3)
EOSINOPHIL # BLD AUTO: 0.12 10*3/MM3 (ref 0–0.7)
EOSINOPHIL NFR BLD AUTO: 0.8 % (ref 0–7)
ERYTHROCYTE [DISTWIDTH] IN BLOOD BY AUTOMATED COUNT: 13.4 % (ref 11.5–14.5)
HCT VFR BLD AUTO: 35.2 % (ref 35–45)
HGB BLD-MCNC: 11.6 G/DL (ref 12–15.5)
HOLD SPECIMEN: NORMAL
IMM GRANULOCYTES # BLD: 0.05 10*3/MM3 (ref 0–0.02)
IMM GRANULOCYTES NFR BLD: 0.3 % (ref 0–0.5)
LYMPHOCYTES # BLD AUTO: 2.28 10*3/MM3 (ref 0.6–4.2)
LYMPHOCYTES NFR BLD AUTO: 15.6 % (ref 10–50)
MCH RBC QN AUTO: 30.2 PG (ref 26.5–34)
MCHC RBC AUTO-ENTMCNC: 33 G/DL (ref 31.4–36)
MCV RBC AUTO: 91.7 FL (ref 80–98)
MONOCYTES # BLD AUTO: 1.52 10*3/MM3 (ref 0–0.9)
MONOCYTES NFR BLD AUTO: 10.4 % (ref 0–12)
NEUTROPHILS # BLD AUTO: 10.57 10*3/MM3 (ref 2–8.6)
NEUTROPHILS NFR BLD AUTO: 72.7 % (ref 37–80)
PLATELET # BLD AUTO: 233 10*3/MM3 (ref 150–450)
PMV BLD AUTO: 11.5 FL (ref 8–12)
RBC # BLD AUTO: 3.84 10*6/MM3 (ref 3.77–5.16)
WBC NRBC COR # BLD: 14.57 10*3/MM3 (ref 3.2–9.8)

## 2017-03-06 PROCEDURE — 85025 COMPLETE CBC W/AUTO DIFF WBC: CPT | Performed by: OBSTETRICS & GYNECOLOGY

## 2017-03-06 PROCEDURE — 0 IOPAMIDOL 61 % SOLUTION: Performed by: OBSTETRICS & GYNECOLOGY

## 2017-03-06 PROCEDURE — 74177 CT ABD & PELVIS W/CONTRAST: CPT

## 2017-03-06 PROCEDURE — 25010000002 CEFOXITIN: Performed by: OBSTETRICS & GYNECOLOGY

## 2017-03-06 RX ORDER — ACETAMINOPHEN AND CODEINE PHOSPHATE 300; 30 MG/1; MG/1
1 TABLET ORAL EVERY 4 HOURS PRN
Status: DISCONTINUED | OUTPATIENT
Start: 2017-03-06 | End: 2017-03-07 | Stop reason: HOSPADM

## 2017-03-06 RX ADMIN — DOXYCYCLINE HYCLATE 100 MG: 100 TABLET, FILM COATED ORAL at 08:37

## 2017-03-06 RX ADMIN — DOXYCYCLINE HYCLATE 100 MG: 100 TABLET, FILM COATED ORAL at 20:59

## 2017-03-06 RX ADMIN — ACETAMINOPHEN AND CODEINE PHOSPHATE 1 TABLET: 300; 30 TABLET ORAL at 11:52

## 2017-03-06 RX ADMIN — METRONIDAZOLE 500 MG: 500 TABLET ORAL at 18:10

## 2017-03-06 RX ADMIN — IOPAMIDOL 92 ML: 612 INJECTION, SOLUTION INTRAVENOUS at 11:00

## 2017-03-06 RX ADMIN — CEFOXITIN 2 G: 2 INJECTION, POWDER, FOR SOLUTION INTRAVENOUS at 02:59

## 2017-03-06 RX ADMIN — Medication 10 ML: at 15:29

## 2017-03-06 RX ADMIN — CEFOXITIN 2 G: 2 INJECTION, POWDER, FOR SOLUTION INTRAVENOUS at 21:00

## 2017-03-06 RX ADMIN — ACETAMINOPHEN AND CODEINE PHOSPHATE 1 TABLET: 300; 30 TABLET ORAL at 20:59

## 2017-03-06 RX ADMIN — OXYCODONE HYDROCHLORIDE AND ACETAMINOPHEN 1 TABLET: 5; 325 TABLET ORAL at 06:37

## 2017-03-06 RX ADMIN — OXYCODONE HYDROCHLORIDE AND ACETAMINOPHEN 1 TABLET: 5; 325 TABLET ORAL at 02:19

## 2017-03-06 RX ADMIN — METRONIDAZOLE 500 MG: 500 TABLET ORAL at 08:37

## 2017-03-06 RX ADMIN — ACETAMINOPHEN AND CODEINE PHOSPHATE 1 TABLET: 300; 30 TABLET ORAL at 16:19

## 2017-03-06 RX ADMIN — CEFOXITIN 2 G: 2 INJECTION, POWDER, FOR SOLUTION INTRAVENOUS at 09:01

## 2017-03-06 RX ADMIN — LEVOTHYROXINE SODIUM 100 MCG: 100 TABLET ORAL at 07:38

## 2017-03-06 RX ADMIN — CEFOXITIN 2 G: 2 INJECTION, POWDER, FOR SOLUTION INTRAVENOUS at 15:29

## 2017-03-06 NOTE — PLAN OF CARE
Problem: Patient Care Overview (Adult)  Goal: Plan of Care Review    03/06/17 1612   Coping/Psychosocial Response Interventions   Plan Of Care Reviewed With patient   Patient Care Overview   Progress improving   Outcome Evaluation   Outcome Summary/Follow up Plan Pain controlled well, CT scan completed, continues on IV antibiotics every 6 hours and highest temp today 99.3       Goal: Adult Individualization and Mutuality  Outcome: Ongoing (interventions implemented as appropriate)  Goal: Discharge Needs Assessment  Outcome: Ongoing (interventions implemented as appropriate)    Problem: Pain, Acute (Adult)  Goal: Identify Related Risk Factors and Signs and Symptoms  Outcome: Ongoing (interventions implemented as appropriate)  Goal: Acceptable Pain Control/Comfort Level  Outcome: Ongoing (interventions implemented as appropriate)

## 2017-03-06 NOTE — PROGRESS NOTES
Jose Ramos DO,[facility]  Gastroenterology  Hepatology  Endoscopy  Board Certified in Internal Medicine and gastroenterology  [address], [address]  [place]. [address]  T- [phone]   F - [phone]     GASTROENTEROLOGY PROGRESS NOTE   JOSE RAMOS DO.         SUBJECTIVE:   3/5/2017  Chief Complaint:     Subjective  : Changes in the patient's gastroenterology consultation.    The patient is currently under passport insurance which I do not except.  For this reason, I will notify Dr. Rod who excepts passport insurance.                         Jose Ramos DO  03/05/17  10:33 PM

## 2017-03-06 NOTE — CONSULTS
Jose Ramos DO,[facility]  Gastroenterology  Hepatology  Endoscopy  Board Certified in Internal Medicine and gastroenterology  [address], [address]  [place]. [address]  - [phone]   F - [phone]     GASTROENTEROLOGY CONSULT NOTE   JOSE RAMOS DO.         SUBJECTIVE:   3/5/2017    Name: Elysia Pike  DOD: 1995    REASON FOR CONSULT: Pain with abnormal CT scan    Chief Complaint:     Chief Complaint   Patient presents with   • Abdominal Pain   • Nausea   • Vomiting   • Dizziness       Subjective : 5 days of lower pelvic pain with abnormal CT scan    Patient is 22 y.o. female presents with no prior history of any significant GI history, presents to the hospital with 5 days of progressive lower pelvic pain.  Pain is in the right and left lower quadrant.  The patient has had no changes in bowel habits that have been noted except for constipation for 2 days.  Patient was seen in the walk-in clinic at the GYN clinic.  At that time, Mrs. David recommended that she take some magnesium citrate which she did.  This did not help the patient's pain.  It progressed until Saturday when the patient presented to the emergency department.  At that time, I CT scan of the abdomen and pelvis was performed that shows the patient to have inflammatory changes emanating from the patient's uterus to the left adnexa with inflammatory changes against the patient's sigmoid colon.    The patient has had no evidence of any vaginal bleeding.  There has been no discharge.  DNA probe showed no evidence of any sexually related vaginitis.  The patient had an IUD that was inserted in January 2017.  The patient's menstrual cycles are continuing to be irregular.  She denies any rectal bleeding.  The patient has had some loose stools that have occurred since she took the magnesium citrate at home.  There has been no mucus within the stools.  There is a family history of probable diverticulitis.  Her aunt had to  have a diverting colostomy with re-anastomosis.    She does have dysuria.  This begins at the beginning of the urinary cycle.  There has been no blood within the urine.    Her fevers have been up to 100.4.  There has been no chills.  There is no back pain.    CT scan has shown the inflammatory changes.  With the patient's presentation with probable pelvic inflammatory disease and with the abnormal CT scan, we are asked to see the patient in consultation..     The patient was started on Cipro and Flagyl as an outpatient.  She has been changed to Rocephin and Vibramycin and Flagyl currently.     ROS/HISTORY/ CURRENT MEDICATIONS/OBJECTIVE/VS/PE:   Review of Systems:   Review of Systems   Constitutional: Positive for chills, fatigue and fever.   HENT: Negative.    Eyes: Negative.    Respiratory: Negative.    Cardiovascular: Negative.    Gastrointestinal: Positive for abdominal pain, constipation, diarrhea and nausea.   Endocrine: Negative.    Genitourinary: Positive for dysuria.   Musculoskeletal: Negative.    Skin: Negative.    Allergic/Immunologic: Negative.    Neurological: Negative.    Hematological: Negative.    Psychiatric/Behavioral: The patient is nervous/anxious.        History:     Past Medical History   Diagnosis Date   • Acne    • Acquired hypothyroidism    • Acute pharyngitis    • Amenorrhea    • Carbuncle of groin    • Excessive and frequent menstruation with irregular cycle    • FH: blood disorder    • Hidradenitis suppurativa    • Hirsutism    • Hypothyroidism    • Irregular periods    • Metabolic syndrome X    • Obesity    • Unspecified vitamin D deficiency      Past Surgical History   Procedure Laterality Date   • Injection of medication  02/11/2016     Toradol (1)     • Brooten tooth extraction       Family History   Problem Relation Age of Onset   • No Known Problems Other    • Hypertension Father    • Diabetes Mother    • Hypertension Mother    • Hyperlipidemia Mother    • No Known Problems Sister       Social History   Substance Use Topics   • Smoking status: Never Smoker   • Smokeless tobacco: Never Used   • Alcohol use No      Comment: occasionally     Prescriptions Prior to Admission   Medication Sig Dispense Refill Last Dose   • ciprofloxacin (CIPRO) 500 MG tablet Take 1 tablet by mouth 2 (Two) Times a Day for 7 days. 14 tablet 0 3/3/2017 at 2100   • levothyroxine (SYNTHROID) 100 MCG tablet Take 1 tablet by mouth Daily. 90 tablet 3 3/3/2017 at 0700   • metFORMIN (GLUCOPHAGE) 500 MG tablet Take 2 tablets by mouth 2 (Two) Times a Day With Meals. (Patient taking differently: Take 500 mg by mouth Daily With Breakfast.) 360 tablet 3 3/3/2017 at 0700   • metroNIDAZOLE (FLAGYL) 500 MG tablet Take 1 tablet by mouth 2 (Two) Times a Day. (Patient taking differently: Take 500 mg by mouth 2 (Two) Times a Day. For 7 days, End 3/9/17) 14 tablet 0 3/3/2017 at 2100   • polyethylene glycol (MIRALAX) packet Take 17 g by mouth Daily. 30 each 5      Allergies:  Aspirin    I have reviewed the patients medical history, surgical history and family history in the available medical record system.     Current Medications:     Current Facility-Administered Medications   Medication Dose Route Frequency Provider Last Rate Last Dose   • cefOXitin (MEFOXIN) 2 g/100 mL 0.9% NS VTB (mbp)  2 g Intravenous Q6H [place]  mL/hr at 03/05/17 1509 2 g at 03/05/17 1509   • doxycycline (VIBRAMYICN) tablet 100 mg  100 mg Oral Q12H Nacho Park MD   100 mg at 03/05/17 0826   • levothyroxine (SYNTHROID, LEVOTHROID) tablet 100 mcg  100 mcg Oral Daily Nacho Park MD   100 mcg at 03/05/17 0748   • metFORMIN (GLUCOPHAGE) tablet 500 mg  500 mg Oral BID With Meals Nacho Park MD   500 mg at 03/05/17 1742   • metroNIDAZOLE (FLAGYL) tablet 500 mg  500 mg Oral BID Nacho Park MD   500 mg at 03/05/17 1742   • oxyCODONE-acetaminophen (PERCOCET) 5-325 MG per tablet 1 tablet  1 tablet Oral Q4H PRN Nacho  Salbador Park MD   1 tablet at 03/05/17 1742   • polyethylene glycol (MIRALAX) packet 17 g  17 g Oral Daily Nacho Park MD   17 g at 03/05/17 1052   • promethazine (PHENERGAN) injection 12.5 mg  12.5 mg Intravenous Q4H PRN Nacho Park MD       • sodium chloride 0.9 % flush 10 mL  10 mL Intravenous PRN Jose Guardado MD   10 mL at 03/05/17 1622   • sodium chloride 0.9 % infusion  125 mL/hr Intravenous Continuous Jose Guardado  mL/hr at 03/05/17 0301 125 mL/hr at 03/05/17 0301       Objective     Physical Exam:   Temp:  [98.4 °F (36.9 °C)-100.4 °F (38 °C)] 99.1 °F (37.3 °C)  Heart Rate:  [] 104  Resp:  [18-20] 18  BP: (132-141)/(57-63) 135/63    Physical Exam:  General Appearance:    Alert, cooperative, in no acute distress   Head:    Normocephalic, without obvious abnormality, atraumatic   Eyes:            Lids and lashes normal, conjunctivae and sclerae normal, no   icterus, no pallor, corneas clear, PERRLA   Ears:    Ears appear intact with no abnormalities noted   Throat:   No oral lesions, no thrush, oral mucosa moist   Neck:   No adenopathy, supple, trachea midline, no thyromegaly, no     carotid bruit, no JVD   Back:     No kyphosis present, no scoliosis present, no skin lesions,       erythema or scars, no tenderness to percussion or                   palpation,   range of motion normal   Lungs:     Clear to auscultation,respirations regular, even and                   unlabored    Heart:    Regular rhythm and normal rate, normal S1 and S2, no            murmur, no gallop, no rub, no click   Breast Exam:    Deferred   Abdomen:     Normal bowel sounds, no masses, no organomegaly, soft        tenderness that is in the pelvic region.  No evidence of any rebound tenderness., non-distended, no guarding, no rebound                 tenderness   Genitalia:    Deferred   Extremities:   Moves all extremities well, no edema, no cyanosis, no              redness   Pulses:   Pulses  palpable and equal bilaterally   Skin:   No bleeding, bruising or rash   Lymph nodes:   No palpable adenopathy   Neurologic:   Cranial nerves 2 - 12 grossly intact, sensation intact, DTR        present and equal bilaterally      Results Review:     Lab Results   Component Value Date    WBC 15.96 (H) 03/04/2017    WBC 11.41 (H) 03/03/2017    WBC 7.1 05/27/2016    HGB 12.6 03/04/2017    HGB 13.4 03/03/2017    HGB 11.9 (L) 05/27/2016    HCT 37.4 03/04/2017    HCT 40.5 03/03/2017    HCT 35.7 05/27/2016     03/04/2017     03/03/2017     05/27/2016       Results from last 7 days  Lab Units 03/04/17  1139 03/03/17  1207   ALK PHOS U/L 65 57   ALT (SGPT) U/L 50 38   AST (SGOT) U/L 25 32       Results from last 7 days  Lab Units 03/04/17  1139 03/03/17  1207   BILIRUBIN mg/dL 0.8 0.8   ALK PHOS U/L 65 57     LIPASE   Date Value Ref Range Status   03/04/2017 66 23 - 300 U/L Final   03/03/2017 76 23 - 300 U/L Final     No results found for: INR       Radiology Review:  Imaging Results (last 72 hours)     Procedure Component Value Units Date/Time    CT Abdomen Pelvis With Contrast [[id number]] Collected:  03/04/17 1257     Updated:  03/04/17 1310    Narrative:       Patient Name:  SITA RODRIGUES  Patient ID:  [id number]   Ordering:  MITZI YOUNG  Attending:  MITZI YOUNG  Referring:  MITZI YOUNG  ------------------------------------------------  DATE OF PROCEDURE:  3/4/2017 12:31 PM CST    PROCEDURE: CT ABDOMEN PELVIS WITH IV CONTRAST    INDICATION FOR PROCEDURE:   22 years -old patient presents for  evaluation of lower abdominal pain.    TECHNIQUE: Contiguous axial images were obtained from lung bases  to the proximal thighs after intravenous administration of 100 mL  of Isovue-300.   Oral contrast was also administered. Multiplanar  reformations are submitted for interpretation. Dose length  product is 3209.9.  Images were acquired in accordance with the  principles of ALARA (as low as reasonably  allowable).    COMPARISON:  Pelvic ultrasound dated March 3, 2017.    FINDINGS: What is seen of the chest wall has a normal appearance.  What is seen of the heart has a normal appearance without obvious  pericardial effusion. Lung bases are clear. No pleural effusions  are visible.    The liver has diffusely decreased attenuation and is enlarged  without obvious mass or dilated intrahepatic biliary ducts. The  liver measures approximately 23.7 cm in greatest cephalocaudal  dimension The gallbladder is present. The spleen has a normal  appearance. Both adrenal glands are within normal limits. The  pancreas has a normal appearance.     Both kidneys have a normal appearance without obvious perinephric  fluid or hydronephrosis. There is no evidence for hydroureter or  radiopaque ureteral calculi.     The urinary bladder has a normal appearance. Uterus has a normal  appearance. The IUD appears to be within the lower uterine  segment and cervix. There is a small amount of pelvic fluid.  There may be some inflammation in the region of the left adnexa.  There is a cystic area near the right ovary within the pelvis  measuring 3.2 cm in size. This is also identified on the recent  ultrasound.    The distal esophagus has a normal appearance. The stomach has a  normal appearance. There is no obvious dilated bowel, ascites or  pneumoperitoneum.  The small bowel has a normal appearance.   Stool is visualized throughout the colon.  There is diffuse  abnormal thickening of the walls of the descending colon and  sigmoid colon suggesting sequela of a colitis. colonic  diverticula are visualized.  Normal appendix is visualized.    Abdominal aorta has a normal tapered appearance. The inferior  vena cava has a normal appearance.    The abdominal wall has a normal appearance.    Imaged thoracic and lumbar vertebral bodies have normal height,  alignment and appearance. Intervertebral disc spaces are within  normal limits.  Bony pelvis has  a normal CT appearance.      Impression:         1.  Acute inflammatory changes within the pelvis, possibly  secondary to PID.   2.  Abnormal thickening of the walls of the descending colon and  sigmoid colon suggesting sequela of colitis.  3.  Hepatomegaly and hepatic steatosis.  4.  Low-lying IUD with the proximal end within the cervix.    Electronically signed by:  Marii Lawler MD  3/4/2017 1:09 PM CST  Workstation: [employer] Non-ob Transvaginal [[id number]] Collected:  03/04/17 1522     Updated:  03/04/17 1528    Narrative:       Patient Name:  SITA RODRIGUES  Patient ID:  [id number]   Ordering:  MITZI YOUNG  Attending:  MITZI YOUNG  Referring:  MITZI YOUNG  ------------------------------------------------    DATE OF EXAM: 3/4/2017 2:40 PM CST    PROCEDURE: US TRANSVAGINAL    INDICATION FOR PROCEDURE: 22 years old patient presents for  evaluation of abdominal pain.  ICD 10 codes:  N73.0 and K52.9.    TECHNIQUE: Multiple static grayscale images are obtained  transvaginally.    COMPARISON:  Pelvic ultrasound dated March 3, 2017 and CT the  abdomen and pelvis dated March 4, 2017..    FINDINGS: The uterus has a grossly normal appearance and appears  anteverted. There are no myometrial masses. Uterus measures 7.4 x  3 0.9, 4.8[ cm.  Endometrium appears hyperechoic and measures 5.4  mm. mm in thickness.   There are multiple nabothian cysts.    Neither ovary was visualized. The ovaries are visualized  previously on the previous ultrasound. There is apparent abnormal  left fallopian tube.    Moderate amount of exudative pelvic fluid is present.      Impression:         1.  IUD is visualized within the lower uterine segment and  cervix.  2.  Exudative pelvic fluid maybe related to pus or hemorrhage.  3.  Ovaries are not visualized on the current study but were  visualized on previous studies.  4.  Questionable abnormal left fallopian tube.    Electronically signed by:  Marii Lawler MD  3/4/2017 3:27 PM  CST  Workstation: [email or web address]           I reviewed the patient's new clinical results.  I reviewed the patient's new imaging results and agree with the interpretation.     ASSESSMENT/PLAN:   ASSESSMENT:   1.  Pelvic inflammatory disease, most likely related to the patient's intrauterine device  2.  Inflammatory changes extending from the mesentery most likely emanating from the patient's pelvic inflammatory disease  3.  Family history of, but a diverticulitis requiring diverting colostomy.  I do not think that this is the situation in the patient.  However, must be totally excluded.    PLAN:   1.  We'll continue the intravenous antibiotics as administered by Dr. Park  2.  CT scan of the pelvis with rectal contrast.  This will further define this more definitively.  If this is abnormal then we probably will do a flexible sigmoidoscopy.       Luisito Mckeon DO  03/05/17  9:41 PM

## 2017-03-06 NOTE — PROGRESS NOTES
[facility]  Elysia Rodrigues  : 1995  MRN: [id number]  CSN: [id number]    Hospital Day: 3  Subjective   Patient is still having some lower abdominal pain, worse on the left.  She tolerated p.o diet with no nausea or vomiting. She denies cough or shortness of air. She is not having any chest pain. She reports she had fever and chills over night.. She is having some dysuria. She denies any vaginal discharge or bleeding. She is still having diarrhea     Objective     Min/max vitals past 24 hours:   Temp  Min: 98.4 °F (36.9 °C)  Max: 100.4 °F (38 °C)  BP  Min: 127/57  Max: 140/57  Pulse  Min: 94  Max: 104  Pulse  Min: 94  Max: 104        I/O last 3 completed shifts:  In: 1000 [I.V.:1000]  Out: -     General: well developed; well nourished  no acute distress   Abdomen: no umbilical or inginual hernias are present  no hepato-splenomegaly   Tenderness in right and left lower quadrant. Worse on the left.   Pelvic: Not performed   Ext: Calves NT     Lab Results   Component Value Date    WBC 14.57 (H) 2017    HGB 11.6 (L) 2017    HCT 35.2 2017    MCV 91.7 2017     2017     2017    K 4.0 2017     2017    CO2 24.0 2017    BUN 12 2017    CREATININE 0.87 2017    GLUCOSE 131 (H) 2017    ALBUMIN 4.00 2017    CALCIUM 8.8 2017    AST 25 2017    ALT 50 2017    BILITOT 0.8 2017    AMYLASE 50 2017    LIPASE 66 2017     Imaging Results (last 24 hours)     Procedure Component Value Units Date/Time    CT Abdomen Pelvis With Contrast [[id number]] Collected:  17 1257     Updated:  17 1310    Narrative:       Patient Name:  ELYSIA RODRIGUES  Patient ID:  [id number]   Ordering:  MITZI YOUNG  Attending:  MITZI YOUNG  Referring:  MITZI YOUNG  ------------------------------------------------  DATE OF PROCEDURE:  3/4/2017 12:31 PM CST    PROCEDURE: CT ABDOMEN PELVIS WITH IV  CONTRAST    INDICATION FOR PROCEDURE:   22 years -old patient presents for  evaluation of lower abdominal pain.    TECHNIQUE: Contiguous axial images were obtained from lung bases  to the proximal thighs after intravenous administration of 100 mL  of Isovue-300.   Oral contrast was also administered. Multiplanar  reformations are submitted for interpretation. Dose length  product is 3209.9.  Images were acquired in accordance with the  principles of ALARA (as low as reasonably allowable).    COMPARISON:  Pelvic ultrasound dated March 3, 2017.    FINDINGS: What is seen of the chest wall has a normal appearance.  What is seen of the heart has a normal appearance without obvious  pericardial effusion. Lung bases are clear. No pleural effusions  are visible.    The liver has diffusely decreased attenuation and is enlarged  without obvious mass or dilated intrahepatic biliary ducts. The  liver measures approximately 23.7 cm in greatest cephalocaudal  dimension The gallbladder is present. The spleen has a normal  appearance. Both adrenal glands are within normal limits. The  pancreas has a normal appearance.     Both kidneys have a normal appearance without obvious perinephric  fluid or hydronephrosis. There is no evidence for hydroureter or  radiopaque ureteral calculi.     The urinary bladder has a normal appearance. Uterus has a normal  appearance. The IUD appears to be within the lower uterine  segment and cervix. There is a small amount of pelvic fluid.  There may be some inflammation in the region of the left adnexa.  There is a cystic area near the right ovary within the pelvis  measuring 3.2 cm in size. This is also identified on the recent  ultrasound.    The distal esophagus has a normal appearance. The stomach has a  normal appearance. There is no obvious dilated bowel, ascites or  pneumoperitoneum.  The small bowel has a normal appearance.   Stool is visualized throughout the colon.  There is diffuse  abnormal  thickening of the walls of the descending colon and  sigmoid colon suggesting sequela of a colitis. colonic  diverticula are visualized.  Normal appendix is visualized.    Abdominal aorta has a normal tapered appearance. The inferior  vena cava has a normal appearance.    The abdominal wall has a normal appearance.    Imaged thoracic and lumbar vertebral bodies have normal height,  alignment and appearance. Intervertebral disc spaces are within  normal limits.  Bony pelvis has a normal CT appearance.      Impression:         1.  Acute inflammatory changes within the pelvis, possibly  secondary to PID.   2.  Abnormal thickening of the walls of the descending colon and  sigmoid colon suggesting sequela of colitis.  3.  Hepatomegaly and hepatic steatosis.  4.  Low-lying IUD with the proximal end within the cervix.    Electronically signed by:  Marii Lawler MD  3/4/2017 1:09 PM CST  Workstation: [email or web address] Non-ob Transvaginal [[id number]] Collected:  03/04/17 1522     Updated:  03/04/17 1528    Narrative:       Patient Name:  SITA RODRIGUES  Patient ID:  [id number]   Ordering:  MITZI YOUNG  Attending:  MITZI YOUNG  Referring:  MITZI YOUNG  ------------------------------------------------    DATE OF EXAM: 3/4/2017 2:40 PM CST    PROCEDURE: US TRANSVAGINAL    INDICATION FOR PROCEDURE: 22 years old patient presents for  evaluation of abdominal pain.  ICD 10 codes:  N73.0 and K52.9.    TECHNIQUE: Multiple static grayscale images are obtained  transvaginally.    COMPARISON:  Pelvic ultrasound dated March 3, 2017 and CT the  abdomen and pelvis dated March 4, 2017..    FINDINGS: The uterus has a grossly normal appearance and appears  anteverted. There are no myometrial masses. Uterus measures 7.4 x  3 0.9, 4.8[ cm.  Endometrium appears hyperechoic and measures 5.4  mm. mm in thickness.   There are multiple nabothian cysts.    Neither ovary was visualized. The ovaries are visualized  previously on the previous  ultrasound. There is apparent abnormal  left fallopian tube.    Moderate amount of exudative pelvic fluid is present.      Impression:         1.  IUD is visualized within the lower uterine segment and  cervix.  2.  Exudative pelvic fluid maybe related to pus or hemorrhage.  3.  Ovaries are not visualized on the current study but were  visualized on previous studies.  4.  Questionable abnormal left fallopian tube.    Electronically signed by:  Marii Lawler MD  3/4/2017 3:27 PM CST  Workstation: [email or web address]           Assessment    22 y.o female  with abdominal pain   1.) PID    2.) Possible colitis          Plan   1. Will continue Cefoxin, Doxycycline, and flagyl  2. Will continue pain control with Percocet prn.  3. GI is following. Patient will have a CT pelvis with rectal contrast today.          This document has been electronically signed by Nacho Corrales MD on 2017 6:26 AM

## 2017-03-07 VITALS
OXYGEN SATURATION: 98 % | BODY MASS INDEX: 45.99 KG/M2 | TEMPERATURE: 98.6 F | WEIGHT: 293 LBS | RESPIRATION RATE: 18 BRPM | DIASTOLIC BLOOD PRESSURE: 72 MMHG | HEIGHT: 67 IN | HEART RATE: 96 BPM | SYSTOLIC BLOOD PRESSURE: 151 MMHG

## 2017-03-07 PROBLEM — K57.32 DIVERTICULITIS OF LARGE INTESTINE WITHOUT PERFORATION OR ABSCESS WITHOUT BLEEDING: Status: ACTIVE | Noted: 2017-03-07

## 2017-03-07 LAB — GLUCOSE BLDC GLUCOMTR-MCNC: 106 MG/DL (ref 70–130)

## 2017-03-07 PROCEDURE — 99232 SBSQ HOSP IP/OBS MODERATE 35: CPT | Performed by: NURSE PRACTITIONER

## 2017-03-07 PROCEDURE — 25010000002 CEFOXITIN: Performed by: OBSTETRICS & GYNECOLOGY

## 2017-03-07 RX ORDER — METRONIDAZOLE 500 MG/1
500 TABLET ORAL 2 TIMES DAILY
Qty: 20 TABLET | Refills: 0 | Status: SHIPPED | OUTPATIENT
Start: 2017-03-07 | End: 2017-03-14

## 2017-03-07 RX ORDER — ACETAMINOPHEN AND CODEINE PHOSPHATE 300; 30 MG/1; MG/1
1 TABLET ORAL EVERY 4 HOURS PRN
Qty: 20 TABLET | Refills: 0 | Status: SHIPPED | OUTPATIENT
Start: 2017-03-07 | End: 2017-03-16

## 2017-03-07 RX ORDER — LEVOFLOXACIN 750 MG/1
750 TABLET ORAL DAILY
Qty: 10 TABLET | Refills: 0 | Status: SHIPPED | OUTPATIENT
Start: 2017-03-07 | End: 2017-03-17

## 2017-03-07 RX ADMIN — CEFOXITIN 2 G: 2 INJECTION, POWDER, FOR SOLUTION INTRAVENOUS at 04:06

## 2017-03-07 RX ADMIN — LEVOTHYROXINE SODIUM 100 MCG: 100 TABLET ORAL at 08:04

## 2017-03-07 RX ADMIN — METRONIDAZOLE 500 MG: 500 TABLET ORAL at 08:04

## 2017-03-07 RX ADMIN — ACETAMINOPHEN AND CODEINE PHOSPHATE 1 TABLET: 300; 30 TABLET ORAL at 04:06

## 2017-03-07 NOTE — DISCHARGE SUMMARY
[facility]  Elysia Pike  : 1995  MRN: [id number]  CSN: [id number]    Discharge Summary      Date of Admission: 3/4/2017   Date of Discharge:    Discharge Diagnosis: 1. Diverticulitis, PID   Procedures Performed:       Consults: GI   Brief History: Patient is a 22 y.o.who presented to the ER with abdominal pain, N/V on 3/4. Imaging showed inflammation in the area of her left adnexa and her colon in the setting of a malpositioned IUD.   Hospital Course: Patient was admitted on 3/4 for IV Abx and GI was consulted. Low grade fevers were noted overnight on the  and through 3/5. F/u CT with rectal contrast showed diverticulitis and did not demonstrate any further gyn abnormalities. The patient noted feeling mostly better and we discharged home on 3/7 in stable condition.   Pending Studies: none   Condition at discharge: stable   Discharge Medications:    Your medication list      START taking these medications       Instructions Last Dose Given Next Dose Due    acetaminophen-codeine 300-30 MG per tablet   Commonly known as:  TYLENOL #3        Take 1 tablet by mouth Every 4 (Four) Hours As Needed for mild pain (1-3) or moderate pain (4-6) for up to 9 days.         levoFLOXacin 750 MG tablet   Commonly known as:  LEVAQUIN        Take 1 tablet by mouth Daily for 10 days.           CHANGE how you take these medications       Instructions Last Dose Given Next Dose Due    metFORMIN 500 MG tablet   Commonly known as:  GLUCOPHAGE   What changed:    - how much to take  - when to take this        Take 2 tablets by mouth 2 (Two) Times a Day With Meals.         metroNIDAZOLE 500 MG tablet   Commonly known as:  FLAGYL   What changed:  additional instructions        Take 1 tablet by mouth 2 (Two) Times a Day.         metroNIDAZOLE 500 MG tablet   Commonly known as:  FLAGYL   What changed:  You were already taking a medication with the same name, and this prescription was added. Make sure you understand how and  when to take each.        Take 1 tablet by mouth 2 (Two) Times a Day for 10 days. Indications: Infection Within the Abdomen           CONTINUE taking these medications       Instructions Last Dose Given Next Dose Due    levothyroxine 100 MCG tablet   Commonly known as:  SYNTHROID        Take 1 tablet by mouth Daily.         polyethylene glycol packet   Commonly known as:  MIRALAX        Take 17 g by mouth Daily.           STOP taking these medications          ciprofloxacin 500 MG tablet   Commonly known as:  CIPRO                Where to Get Your Medications      You can get these medications from any pharmacy     Bring a paper prescription for each of these medications    • acetaminophen-codeine 300-30 MG per tablet   • levoFLOXacin 750 MG tablet   • metroNIDAZOLE 500 MG tablet            Discharge Disposition: home   Follow-up: 1 week with Dr Rod and Dr Park         This note has been electronically signed.    Nacho Park MD  March 7, 2017  7:32 AM

## 2017-03-07 NOTE — PROGRESS NOTES
[facility]  Gastroenterology  Inpatient Progress Note  Today's date:  03/07/17    Elysia Pike  1995       Reason for Follow Up:  Diverticulitis, abdominal pain    Subjective:   Patient seen in follow-up after repeat CT with rectal contrast.  Which showed diverticulitis.  Patient reports abdominal pain has slightly improved she is still some tender.  Tolerating a regular diet.  She denies any nausea or vomiting.  Reports she will be discharged shortly.  Plan; she will follow up outpatient after finishing Levaquin and Flagyl.    Allergies   Allergen Reactions   • Aspirin Other (See Comments)     Eye swelling       No current facility-administered medications for this encounter.     Current Outpatient Prescriptions:   •  levothyroxine (SYNTHROID) 100 MCG tablet, Take 1 tablet by mouth Daily., Disp: 90 tablet, Rfl: 3  •  metFORMIN (GLUCOPHAGE) 500 MG tablet, Take 2 tablets by mouth 2 (Two) Times a Day With Meals. (Patient taking differently: Take 500 mg by mouth Daily With Breakfast.), Disp: 360 tablet, Rfl: 3  •  metroNIDAZOLE (FLAGYL) 500 MG tablet, Take 1 tablet by mouth 2 (Two) Times a Day. (Patient taking differently: Take 500 mg by mouth 2 (Two) Times a Day. For 7 days, End 3/9/17), Disp: 14 tablet, Rfl: 0  •  acetaminophen-codeine (TYLENOL #3) 300-30 MG per tablet, Take 1 tablet by mouth Every 4 (Four) Hours As Needed for mild pain (1-3) or moderate pain (4-6) for up to 9 days., Disp: 20 tablet, Rfl: 0  •  levoFLOXacin (LEVAQUIN) 750 MG tablet, Take 1 tablet by mouth Daily for 10 days., Disp: 10 tablet, Rfl: 0  •  metroNIDAZOLE (FLAGYL) 500 MG tablet, Take 1 tablet by mouth 2 (Two) Times a Day for 10 days. Indications: Infection Within the Abdomen, Disp: 20 tablet, Rfl: 0  •  polyethylene glycol (MIRALAX) packet, Take 17 g by mouth Daily., Disp: 30 each, Rfl: 5    Review of Systems:   Review of Systems   Constitutional: Negative for activity change, appetite change, chills, diaphoresis, fatigue,  fever and unexpected weight change.   HENT: Negative for sore throat and trouble swallowing.    Respiratory: Negative for shortness of breath.    Gastrointestinal: Positive for abdominal pain. Negative for abdominal distention, anal bleeding, blood in stool, constipation, diarrhea, nausea, rectal pain and vomiting.   Musculoskeletal: Negative for arthralgias.   Skin: Negative for pallor.   Neurological: Negative for light-headedness.        Vital Signs:  Temp:  [98.6 °F (37 °C)-99.7 °F (37.6 °C)] 98.6 °F (37 °C)  Heart Rate:  [91-99] 96  Resp:  [16-18] 18  BP: (132-151)/(58-73) 151/72  Body mass index is 50.75 kg/(m^2).     Intake/Output Summary (Last 24 hours) at 03/07/17 1242  Last data filed at 03/07/17 0406   Gross per 24 hour   Intake    300 ml   Output      0 ml   Net    300 ml          Physical Exam:  Physical Exam   Constitutional: She is oriented to person, place, and time. She appears well-developed and well-nourished. She is cooperative. No distress.   HENT:   Head: Normocephalic and atraumatic.   Neck: Normal range of motion. Neck supple. No thyromegaly present.   Cardiovascular: Normal rate, regular rhythm and normal heart sounds.    Pulmonary/Chest: Effort normal and breath sounds normal. She has no wheezes. She has no rhonchi. She has no rales.   Abdominal: Soft. Normal appearance and bowel sounds are normal. She exhibits no shifting dullness, no distension and no fluid wave. There is no hepatosplenomegaly. There is no tenderness. There is no rigidity and no guarding. No hernia.   Lymphadenopathy:     She has no cervical adenopathy.   Neurological: She is alert and oriented to person, place, and time.   Skin: Skin is warm, dry and intact. No rash noted. No pallor.   Psychiatric: She has a normal mood and affect. Her speech is normal.        Results Review:   I have reviewed all of the patient's current test results      Results from last 7 days  Lab Units 03/06/17  0519 03/04/17  1139 03/03/17  1207    WBC 10*3/mm3 14.57* 15.96* 11.41*   HEMOGLOBIN g/dL 11.6* 12.6 13.4   HEMATOCRIT % 35.2 37.4 40.5   PLATELETS 10*3/mm3 233 226 259       Lab Results (last 24 hours)     Procedure Component Value Units Date/Time    Extra Tubes [[id number]] Collected:  03/06/17 0519    Specimen:  Blood from Blood, Venous Line Updated:  03/06/17 1001    Narrative:       The following orders were created for panel order Extra Tubes.  Procedure                               Abnormality         Status                     ---------                               -----------         ------                     Green Top (Gel)[[id number]]                                   Final result                 Please view results for these tests on the individual orders.    Green Top (Gel) [[id number]] Collected:  03/06/17 0519    Specimen:  Blood Updated:  03/06/17 1001     Extra Tube Hold for add-ons.       Auto resulted.       POC Glucose Fingerstick [[id number]]  (Normal) Collected:  03/04/17 1945    Specimen:  Blood Updated:  03/07/17 0603     Glucose 106 mg/dL       Meter: [id number]: [id number] HOPPER DEBO                       0  Lab Value Date/Time   LIPASE 66 03/04/2017 1139   LIPASE 76 03/03/2017 1207       Radiology Review:  Imaging Results (last 24 hours)     Procedure Component Value Units Date/Time    CT Abdomen Pelvis With Contrast [[id number]] Collected:  03/06/17 1050     Updated:  03/06/17 1100    Narrative:         [facility]    Patient Name: SHILA RODRIGUESIE    ORDERING: JOSE RAMOS    ATTENDING: DIAMOND MACK     REFERRING: JOSE RAMOS  -----------------------    PROCEDURE: CT abdomen and pelvis with contrast on 3/6/2017    CLINICAL INDICATION:  Evaluate inflammation of colon, Follow-up  abnormal CT, lower abdominal pain    TECHNIQUE: Multiple axial images are obtained throughout the  abdomen and pelvis following the administration of IV and rectal  contrast.   Total DLP is 2231.8  mGy*cm.    COMPARISON: 3/4/2017     FINDINGS:     ABDOMEN: There is elevation of the right hemidiaphragm. There is  minimal adjacent right basilar atelectasis. There is fatty  infiltration of the liver. Mild splenomegaly is noted with the  spleen measuring 15.2 cm in greatest dhoj-jr-rnne length. The  solid abdominal organs are otherwise unremarkable. There is no  abdominal adenopathy. There is no free fluid or free air within  the abdomen. The abdominal portion of the GI tract is  unremarkable.    Pelvis: There is fat stranding centered around the sigmoid colon.  There is associated bowel wall thickening of the sigmoid colon in  a region of diverticula consistent with acute diverticulitis.  There is no evidence of perforation or abscess formation. Site of  inflammatory process along the sigmoid colon is seen best along  the left inferior aspect of the sigmoid colon on coronal image 27  and on the previous examination on coronal image 36. Inflammatory  process does involve adjacent loops of ileum in the right lower  quadrant that appear to be secondarily involved from the adjacent  diverticulitis. There is some bowel wall thickening with fat  stranding around the loops of ileum in the right lower quadrant.  Small amount of free fluid in the pelvis is likely physiologic  and/or related to the inflammatory process. Intrauterine device  has been removed in the interim. Pelvic organs appear  unremarkable by CT. There is no pelvic adenopathy. Pelvic portion  of the GI tract including the appendix is otherwise unremarkable.  No acute bony abnormality is noted.      Impression:       CONCLUSION:   1. Findings consistent with acute sigmoid colon diverticulitis  with continued inflammatory process centered in the left pelvis  with secondary involvement of loops of distal ileum in the right  pelvis. There is no evidence of abscess or perforation.  2. Fatty infiltration of the liver.  3. Mild splenomegaly.    Electronically  signed by:  Bobby Martin  3/6/2017 10:59 AM  CST Workstation: [facility]          Impression/Plan:  Patient Active Problem List   Diagnosis Code   • PID (acute pelvic inflammatory disease) N73.0   • Diverticulitis of large intestine without perforation or abscess without bleeding K57.32     Plan discuss with Dr. Rod who is in agreement.          This document has been electronically signed by BHARAT Fried on March 7, 2017 12:42 PM    BHARAT Soto  03/07/17  12:42 PM

## 2017-03-07 NOTE — PLAN OF CARE
Problem: Patient Care Overview (Adult)  Goal: Plan of Care Review  Outcome: Ongoing (interventions implemented as appropriate)  Pain well controlled.  Temp 99.2.  IV antibiotics given every 6 hours.    03/07/17 0348   Coping/Psychosocial Response Interventions   Plan Of Care Reviewed With patient   Patient Care Overview   Progress improving       Goal: Adult Individualization and Mutuality  Outcome: Ongoing (interventions implemented as appropriate)  Goal: Discharge Needs Assessment  Outcome: Ongoing (interventions implemented as appropriate)    Problem: Pain, Acute (Adult)  Goal: Identify Related Risk Factors and Signs and Symptoms  Outcome: Ongoing (interventions implemented as appropriate)  Goal: Acceptable Pain Control/Comfort Level  Outcome: Ongoing (interventions implemented as appropriate)

## 2017-03-07 NOTE — PROGRESS NOTES
[facility]  Elysia Rodrigues  : 1995  MRN: [id number]  CSN: [id number]    Hospital Day: 4  Subjective   Patient is still having some lower abdominal pain. This seems to be worse on the right.  She tolerated p.o diet with no nausea or vomiting. She denies cough or shortness of air. She is not having any chest pain. She did not have any fever or chills over night.. She is having some improvement of her dysuria. She denies any vaginal discharge or bleeding. She is still having diarrhea.      Objective     Min/max vitals past 24 hours:   Temp  Min: 99.3 °F (37.4 °C)  Max: 99.7 °F (37.6 °C)  BP  Min: 132/58  Max: 144/64  Pulse  Min: 91  Max: 100  Pulse  Min: 91  Max: 100        I/O last 3 completed shifts:  In: 300 [IV Piggyback:300]  Out: -     General: well developed; well nourished  no acute distress   Abdomen: no umbilical or inginual hernias are present  no hepato-splenomegaly   Tenderness in right and left lower quadrant. Worse on the right.   Pelvic: Not performed   Ext: Calves NT     Lab Results   Component Value Date    WBC 14.57 (H) 2017    HGB 11.6 (L) 2017    HCT 35.2 2017    MCV 91.7 2017     2017     2017    K 4.0 2017     2017    CO2 24.0 2017    BUN 12 2017    CREATININE 0.87 2017    GLUCOSE 131 (H) 2017    ALBUMIN 4.00 2017    CALCIUM 8.8 2017    AST 25 2017    ALT 50 2017    BILITOT 0.8 2017    AMYLASE 50 2017    LIPASE 66 2017     Imaging Results (last 24 hours)     Procedure Component Value Units Date/Time    CT Abdomen Pelvis With Contrast [[id number]] Collected:  17 1257     Updated:  17 1310    Narrative:       Patient Name:  ELYSIA RODRIGUES  Patient ID:  [id number]   Ordering:  MITZI YOUNG  Attending:  MITZI YOUNG  Referring:  MITZI YOUNG  ------------------------------------------------  DATE OF PROCEDURE:  3/4/2017 12:31 PM  CST    PROCEDURE: CT ABDOMEN PELVIS WITH IV CONTRAST    INDICATION FOR PROCEDURE:   22 years -old patient presents for  evaluation of lower abdominal pain.    TECHNIQUE: Contiguous axial images were obtained from lung bases  to the proximal thighs after intravenous administration of 100 mL  of Isovue-300.   Oral contrast was also administered. Multiplanar  reformations are submitted for interpretation. Dose length  product is 3209.9.  Images were acquired in accordance with the  principles of ALARA (as low as reasonably allowable).    COMPARISON:  Pelvic ultrasound dated March 3, 2017.    FINDINGS: What is seen of the chest wall has a normal appearance.  What is seen of the heart has a normal appearance without obvious  pericardial effusion. Lung bases are clear. No pleural effusions  are visible.    The liver has diffusely decreased attenuation and is enlarged  without obvious mass or dilated intrahepatic biliary ducts. The  liver measures approximately 23.7 cm in greatest cephalocaudal  dimension The gallbladder is present. The spleen has a normal  appearance. Both adrenal glands are within normal limits. The  pancreas has a normal appearance.     Both kidneys have a normal appearance without obvious perinephric  fluid or hydronephrosis. There is no evidence for hydroureter or  radiopaque ureteral calculi.     The urinary bladder has a normal appearance. Uterus has a normal  appearance. The IUD appears to be within the lower uterine  segment and cervix. There is a small amount of pelvic fluid.  There may be some inflammation in the region of the left adnexa.  There is a cystic area near the right ovary within the pelvis  measuring 3.2 cm in size. This is also identified on the recent  ultrasound.    The distal esophagus has a normal appearance. The stomach has a  normal appearance. There is no obvious dilated bowel, ascites or  pneumoperitoneum.  The small bowel has a normal appearance.   Stool is visualized  throughout the colon.  There is diffuse  abnormal thickening of the walls of the descending colon and  sigmoid colon suggesting sequela of a colitis. colonic  diverticula are visualized.  Normal appendix is visualized.    Abdominal aorta has a normal tapered appearance. The inferior  vena cava has a normal appearance.    The abdominal wall has a normal appearance.    Imaged thoracic and lumbar vertebral bodies have normal height,  alignment and appearance. Intervertebral disc spaces are within  normal limits.  Bony pelvis has a normal CT appearance.      Impression:         1.  Acute inflammatory changes within the pelvis, possibly  secondary to PID.   2.  Abnormal thickening of the walls of the descending colon and  sigmoid colon suggesting sequela of colitis.  3.  Hepatomegaly and hepatic steatosis.  4.  Low-lying IUD with the proximal end within the cervix.    Electronically signed by:  Marii Lawler MD  3/4/2017 1:09 PM CST  Workstation: [employer] [facility] Transvaginal [[id number]] Collected:  03/04/17 1522     Updated:  03/04/17 1528    Narrative:       Patient Name:  SITA RODRIGUES  Patient ID:  [id number]   Ordering:  MITZI YOUNG  Attending:  MITZI YOUNG  Referring:  MITZI YOUNG  ------------------------------------------------    DATE OF EXAM: 3/4/2017 2:40 PM CST    PROCEDURE: US TRANSVAGINAL    INDICATION FOR PROCEDURE: 22 years old patient presents for  evaluation of abdominal pain.  ICD 10 codes:  N73.0 and K52.9.    TECHNIQUE: Multiple static grayscale images are obtained  transvaginally.    COMPARISON:  Pelvic ultrasound dated March 3, 2017 and CT the  abdomen and pelvis dated March 4, 2017..    FINDINGS: The uterus has a grossly normal appearance and appears  anteverted. There are no myometrial masses. Uterus measures 7.4 x  3 0.9, 4.8[ cm.  Endometrium appears hyperechoic and measures 5.4  mm. mm in thickness.   There are multiple nabothian cysts.    Neither ovary was visualized. The  ovaries are visualized  previously on the previous ultrasound. There is apparent abnormal  left fallopian tube.    Moderate amount of exudative pelvic fluid is present.      Impression:         1.  IUD is visualized within the lower uterine segment and  cervix.  2.  Exudative pelvic fluid maybe related to pus or hemorrhage.  3.  Ovaries are not visualized on the current study but were  visualized on previous studies.  4.  Questionable abnormal left fallopian tube.    Electronically signed by:  Marii Lawler MD  3/4/2017 3:27 PM CST  Workstation[facility]    22 y.o female  with abdominal pain   1.) PID    2.) Divertisulitis          Plan   1. Will continue Cefoxin, Doxycycline, and flagyl  2. Will continue pain control with Percocet prn.  3. GI is following.           This document has been electronically signed by Nacho Corrales MD on 2017 6:22 AM

## 2017-03-14 ENCOUNTER — OFFICE VISIT (OUTPATIENT)
Dept: OBSTETRICS AND GYNECOLOGY | Facility: CLINIC | Age: 22
End: 2017-03-14

## 2017-03-14 ENCOUNTER — LAB (OUTPATIENT)
Dept: LAB | Facility: HOSPITAL | Age: 22
End: 2017-03-14

## 2017-03-14 VITALS
BODY MASS INDEX: 45.99 KG/M2 | SYSTOLIC BLOOD PRESSURE: 122 MMHG | DIASTOLIC BLOOD PRESSURE: 86 MMHG | WEIGHT: 293 LBS | HEIGHT: 67 IN

## 2017-03-14 DIAGNOSIS — K57.32 DIVERTICULITIS OF LARGE INTESTINE WITHOUT PERFORATION OR ABSCESS WITHOUT BLEEDING: ICD-10-CM

## 2017-03-14 DIAGNOSIS — N73.0 PID (ACUTE PELVIC INFLAMMATORY DISEASE): ICD-10-CM

## 2017-03-14 DIAGNOSIS — E03.9 ACQUIRED HYPOTHYROIDISM: ICD-10-CM

## 2017-03-14 DIAGNOSIS — N73.0 PID (ACUTE PELVIC INFLAMMATORY DISEASE): Primary | ICD-10-CM

## 2017-03-14 LAB
DEPRECATED RDW RBC AUTO: 46.6 FL (ref 36.4–46.3)
ERYTHROCYTE [DISTWIDTH] IN BLOOD BY AUTOMATED COUNT: 13.9 % (ref 11.5–14.5)
HCT VFR BLD AUTO: 37.5 % (ref 35–45)
HGB BLD-MCNC: 12.3 G/DL (ref 12–15.5)
MCH RBC QN AUTO: 30.1 PG (ref 26.5–34)
MCHC RBC AUTO-ENTMCNC: 32.8 G/DL (ref 31.4–36)
MCV RBC AUTO: 91.7 FL (ref 80–98)
PLATELET # BLD AUTO: 548 10*3/MM3 (ref 150–450)
PMV BLD AUTO: 10.6 FL (ref 8–12)
RBC # BLD AUTO: 4.09 10*6/MM3 (ref 3.77–5.16)
TSH SERPL DL<=0.05 MIU/L-ACNC: 2.02 MIU/ML (ref 0.46–4.68)
WBC NRBC COR # BLD: 12.33 10*3/MM3 (ref 3.2–9.8)

## 2017-03-14 PROCEDURE — 85027 COMPLETE CBC AUTOMATED: CPT | Performed by: OBSTETRICS & GYNECOLOGY

## 2017-03-14 PROCEDURE — 36415 COLL VENOUS BLD VENIPUNCTURE: CPT

## 2017-03-14 PROCEDURE — 99212 OFFICE O/P EST SF 10 MIN: CPT | Performed by: OBSTETRICS & GYNECOLOGY

## 2017-03-14 PROCEDURE — 84443 ASSAY THYROID STIM HORMONE: CPT | Performed by: OBSTETRICS & GYNECOLOGY

## 2017-03-14 RX ORDER — ONDANSETRON 4 MG/1
TABLET, ORALLY DISINTEGRATING ORAL
Qty: 30 TABLET | Refills: 0 | OUTPATIENT
Start: 2017-03-14

## 2017-03-14 NOTE — PROGRESS NOTES
Subjective   Elysia Pike is a 22 y.o. female.     HPI Comments: Patient presents today for f/u after recent hospitalization for PID/diverticulitis.  Had a malpositioned Yumiko in her cervix which I removed in the ER last wk and we started Abx.  Initial imaging was consistent with PID and or colitis, f/u imaging showed diverticulitis.  Reports that pelvic pain has resolved but she still has mild upper abdominal pain. F/u is planned with GI for next wk   No further fever after discharge.  LMP: current  G0  Currently taking Flagyl and Levaquin.  Periods are irregular less frequent than monthly but long lasting sometimes with 2 wks of bleeding.  JAZMINE did not control periods and seemed to increase bleeding.  Is sexually active but not interested in pregnancy.  Discussed contraceptive options with R/B/A for each.      The following portions of the patient's history were reviewed and updated as appropriate: allergies, current medications, past family history, past medical history, past social history, past surgical history and problem list.      Review of Systems   Gastrointestinal: Positive for abdominal pain.   Genitourinary: Positive for menstrual problem and vaginal bleeding. Negative for pelvic pain.       Objective   Physical Exam    Assessment/Plan   Elysia was seen today for follow-up.    Diagnoses and all orders for this visit:    PID (acute pelvic inflammatory disease)  -     CBC (No Diff); Future    Diverticulitis of large intestine without perforation or abscess without bleeding  -     CBC (No Diff); Future    Acquired hypothyroidism  -     TSH; Future       Check TSH and CBC  Complete Abx  F/u next wk with GI as scheduled  Declines contraception at this time. Discussed condoms and plan B  F/u 2 mo to review contraceptive options.

## 2017-03-21 ENCOUNTER — OFFICE VISIT (OUTPATIENT)
Dept: GASTROENTEROLOGY | Facility: CLINIC | Age: 22
End: 2017-03-21

## 2017-03-21 VITALS
HEIGHT: 67 IN | BODY MASS INDEX: 45.99 KG/M2 | WEIGHT: 293 LBS | SYSTOLIC BLOOD PRESSURE: 141 MMHG | HEART RATE: 92 BPM | DIASTOLIC BLOOD PRESSURE: 82 MMHG

## 2017-03-21 DIAGNOSIS — K57.90 DIVERTICULOSIS OF INTESTINE WITHOUT BLEEDING, UNSPECIFIED INTESTINAL TRACT LOCATION: Primary | ICD-10-CM

## 2017-03-21 PROCEDURE — 99213 OFFICE O/P EST LOW 20 MIN: CPT | Performed by: INTERNAL MEDICINE

## 2017-03-21 RX ORDER — SODIUM, POTASSIUM,MAG SULFATES 17.5-3.13G
1 SOLUTION, RECONSTITUTED, ORAL ORAL EVERY 12 HOURS
Qty: 1 BOTTLE | Refills: 0 | Status: SHIPPED | OUTPATIENT
Start: 2017-03-21 | End: 2017-04-03 | Stop reason: CLARIF

## 2017-03-21 RX ORDER — SODIUM CHLORIDE 0.9 % (FLUSH) 0.9 %
1-10 SYRINGE (ML) INJECTION AS NEEDED
Status: CANCELLED | OUTPATIENT
Start: 2017-05-26

## 2017-03-21 RX ORDER — DEXTROSE AND SODIUM CHLORIDE 5; .45 G/100ML; G/100ML
30 INJECTION, SOLUTION INTRAVENOUS CONTINUOUS PRN
Status: CANCELLED | OUTPATIENT
Start: 2017-05-26

## 2017-03-21 NOTE — PROGRESS NOTES
[facility], [facility]      Chief Complaint:   Chief Complaint   Patient presents with   • Diverticulitis     [facility] follow up       Subjective     HPI:   Patient with recent admission to the hospital for diverticulitis.  Patient is off antibiotics and states bowel movements are normal at this time.  Patient has never had a colonoscopy.    Plan; because of patient's young age will schedule patient for colonoscopy to evaluate red tonic causes of patient's diverticular disease.    Past Medical History:   Past Medical History   Diagnosis Date   • Acne    • Acquired hypothyroidism    • Acute pharyngitis    • Amenorrhea    • Carbuncle of groin    • Excessive and frequent menstruation with irregular cycle    • FH: blood disorder    • Hidradenitis suppurativa    • Hirsutism    • Hypothyroidism    • Irregular periods    • Metabolic syndrome X    • Obesity    • Unspecified vitamin D deficiency        Family History:  Family History   Problem Relation Age of Onset   • No Known Problems Other    • Hypertension Father    • Diabetes Mother    • Hypertension Mother    • Hyperlipidemia Mother    • No Known Problems Sister        Social History:   reports that she has never smoked. She has never used smokeless tobacco. She reports that she drinks alcohol. She reports that she does not use illicit drugs.    Medications:   Current Outpatient Prescriptions   Medication Sig Dispense Refill   • levothyroxine (SYNTHROID) 100 MCG tablet Take 1 tablet by mouth Daily. 90 tablet 3   • metFORMIN (GLUCOPHAGE) 500 MG tablet Take 2 tablets by mouth 2 (Two) Times a Day With Meals. (Patient taking differently: Take 500 mg by mouth Daily With Breakfast.) 360 tablet 3   • polyethylene glycol (MIRALAX) packet Take 17 g by mouth Daily. 30 each 5   • sodium-potassium-magnesium sulfates (SUPREP) solution oral solution Take 1 bottle by mouth Every 12 (Twelve) Hours. 1 bottle 0     No current facility-administered medications for this visit.  "       Allergies:  Aspirin    ROS:    Review of Systems   HENT: Negative for congestion, sore throat and trouble swallowing.    Respiratory: Negative for apnea, cough, choking, chest tightness, shortness of breath, wheezing and stridor.    Cardiovascular: Negative for chest pain, palpitations and leg swelling.   Gastrointestinal: Negative for abdominal distention, abdominal pain, anal bleeding, blood in stool, constipation, diarrhea, nausea, rectal pain and vomiting.   Skin: Negative for color change, pallor, rash and wound.   Neurological: Negative for dizziness, syncope, weakness and headaches.   Psychiatric/Behavioral: Negative for agitation, behavioral problems, confusion and decreased concentration.     Objective     Blood pressure 141/82, pulse 92, height 67\" (170.2 cm), weight (!) 325 lb 8 oz (148 kg), not currently breastfeeding.    Physical Exam   Constitutional: She is oriented to person, place, and time. She appears well-developed and well-nourished. No distress.   HENT:   Head: Normocephalic and atraumatic.   Cardiovascular: Normal rate, regular rhythm, normal heart sounds and intact distal pulses.  Exam reveals no gallop and no friction rub.    No murmur heard.  Pulmonary/Chest: Breath sounds normal. No respiratory distress. She has no wheezes. She has no rales. She exhibits no tenderness.   Abdominal: Soft. Bowel sounds are normal. She exhibits no distension and no mass. There is no tenderness. There is no rebound and no guarding. No hernia.   Musculoskeletal: Normal range of motion. She exhibits no edema.   Neurological: She is alert and oriented to person, place, and time.   Skin: Skin is warm and dry. No rash noted. She is not diaphoretic. No erythema. No pallor.   Psychiatric: She has a normal mood and affect. Her behavior is normal. Judgment and thought content normal.        Assessment/Plan   Elysia was seen today for diverticulitis.    Diagnoses and all orders for this visit:    Diverticulosis of " intestine without bleeding, unspecified intestinal tract location  -     Case Request; Standing  -     sodium chloride 0.9 % flush 1-10 mL; Infuse 1-10 mL into a venous catheter As Needed for Line Care.  -     dextrose 5 % and sodium chloride 0.45 % infusion; Infuse 30 mL/hr into a venous catheter Continuous As Needed (Start Prior to Procedure).  -     Case Request    Other orders  -     Implement Anesthesia Orders Day of Procedure; Standing  -     Obtain Informed Consent; Standing  -     Verify Informed Consent; Standing  -     POC Glucose Fingerstick; Standing  -     Insert Peripheral IV; Standing  -     Saline Lock & Maintain IV Access; Standing  -     sodium-potassium-magnesium sulfates (SUPREP) solution oral solution; Take 1 bottle by mouth Every 12 (Twelve) Hours.        COLONOSCOPY (N/A)     Diagnosis Plan   1. Diverticulosis of intestine without bleeding, unspecified intestinal tract location  Case Request    sodium chloride 0.9 % flush 1-10 mL    dextrose 5 % and sodium chloride 0.45 % infusion    Case Request       Anticipated Surgical Procedure:  No orders of the defined types were placed in this encounter.      The risks, benefits, and alternatives of this procedure have been discussed with the patient or the responsible party- the patient understands and agrees to proceed.

## 2017-04-24 DIAGNOSIS — K57.52 DIVERTICULITIS OF BOTH SMALL AND LARGE INTESTINE WITHOUT PERFORATION OR ABSCESS WITHOUT BLEEDING: Primary | ICD-10-CM

## 2017-04-24 RX ORDER — LEVOFLOXACIN 500 MG/1
500 TABLET, FILM COATED ORAL DAILY
Qty: 10 TABLET | Refills: 0 | Status: SHIPPED | OUTPATIENT
Start: 2017-04-24 | End: 2017-05-04

## 2017-04-24 RX ORDER — FLUCONAZOLE 150 MG/1
150 TABLET ORAL ONCE
Qty: 2 TABLET | Refills: 1 | Status: SHIPPED | OUTPATIENT
Start: 2017-04-24 | End: 2017-04-24

## 2017-04-24 RX ORDER — METRONIDAZOLE 500 MG/1
500 TABLET ORAL 2 TIMES DAILY
Qty: 20 TABLET | Refills: 1 | Status: SHIPPED | OUTPATIENT
Start: 2017-04-24 | End: 2018-11-07

## 2017-04-24 RX ORDER — ACETAMINOPHEN AND CODEINE PHOSPHATE 300; 30 MG/1; MG/1
1-2 TABLET ORAL EVERY 4 HOURS PRN
Qty: 60 TABLET | Refills: 1 | Status: SHIPPED | OUTPATIENT
Start: 2017-04-24 | End: 2018-11-07

## 2017-04-24 RX ORDER — ONDANSETRON 4 MG/1
TABLET, ORALLY DISINTEGRATING ORAL
Qty: 20 TABLET | Refills: 1 | Status: SHIPPED | OUTPATIENT
Start: 2017-04-24 | End: 2018-11-07

## 2017-05-18 ENCOUNTER — LAB (OUTPATIENT)
Dept: LAB | Facility: OTHER | Age: 22
End: 2017-05-18

## 2017-05-18 DIAGNOSIS — N70.92 OVARIAN ABSCESS: ICD-10-CM

## 2017-05-18 DIAGNOSIS — N70.92 OVARIAN ABSCESS: Primary | ICD-10-CM

## 2017-05-18 LAB
ALBUMIN SERPL-MCNC: 3.4 G/DL (ref 3.2–5.5)
ALBUMIN/GLOB SERPL: 0.7 G/DL (ref 1–3)
ALP SERPL-CCNC: 51 U/L (ref 15–121)
ALT SERPL W P-5'-P-CCNC: 23 U/L (ref 10–60)
ANION GAP SERPL CALCULATED.3IONS-SCNC: 13 MMOL/L (ref 5–15)
AST SERPL-CCNC: 21 U/L (ref 10–60)
BASOPHILS # BLD AUTO: 0.03 10*3/MM3 (ref 0–0.2)
BASOPHILS NFR BLD AUTO: 0.3 % (ref 0–2)
BILIRUB SERPL-MCNC: 0.4 MG/DL (ref 0.2–1)
BUN BLD-MCNC: 10 MG/DL (ref 8–25)
BUN/CREAT SERPL: 12.5 (ref 7–25)
CALCIUM SPEC-SCNC: 8.9 MG/DL (ref 8.4–10.8)
CHLORIDE SERPL-SCNC: 97 MMOL/L (ref 100–112)
CO2 SERPL-SCNC: 26 MMOL/L (ref 20–32)
CREAT BLD-MCNC: 0.8 MG/DL (ref 0.4–1.3)
DEPRECATED RDW RBC AUTO: 45.7 FL (ref 36.4–46.3)
EOSINOPHIL # BLD AUTO: 0.23 10*3/MM3 (ref 0–0.7)
EOSINOPHIL NFR BLD AUTO: 2.2 % (ref 0–7)
ERYTHROCYTE [DISTWIDTH] IN BLOOD BY AUTOMATED COUNT: 14.2 % (ref 11.5–14.5)
GFR SERPL CREATININE-BSD FRML MDRD: 89 ML/MIN/1.73 (ref 71–165)
GLOBULIN UR ELPH-MCNC: 4.7 GM/DL (ref 2.5–4.6)
GLUCOSE BLD-MCNC: 99 MG/DL (ref 70–100)
HCT VFR BLD AUTO: 36.6 % (ref 35–45)
HGB BLD-MCNC: 11.6 G/DL (ref 12–15.5)
LYMPHOCYTES # BLD AUTO: 2.83 10*3/MM3 (ref 0.6–4.2)
LYMPHOCYTES NFR BLD AUTO: 26.8 % (ref 10–50)
MCH RBC QN AUTO: 28.7 PG (ref 26.5–34)
MCHC RBC AUTO-ENTMCNC: 31.7 G/DL (ref 31.4–36)
MCV RBC AUTO: 90.6 FL (ref 80–98)
MONOCYTES # BLD AUTO: 1.2 10*3/MM3 (ref 0–0.9)
MONOCYTES NFR BLD AUTO: 11.4 % (ref 0–12)
NEUTROPHILS # BLD AUTO: 6.28 10*3/MM3 (ref 2–8.6)
NEUTROPHILS NFR BLD AUTO: 59.3 % (ref 37–80)
PLATELET # BLD AUTO: 398 10*3/MM3 (ref 150–450)
PMV BLD AUTO: 10.5 FL (ref 8–12)
POTASSIUM BLD-SCNC: 4.3 MMOL/L (ref 3.4–5.4)
PROT SERPL-MCNC: 8.1 G/DL (ref 6.7–8.2)
RBC # BLD AUTO: 4.04 10*6/MM3 (ref 3.77–5.16)
SODIUM BLD-SCNC: 136 MMOL/L (ref 134–146)
WBC NRBC COR # BLD: 10.57 10*3/MM3 (ref 3.2–9.8)

## 2017-05-18 PROCEDURE — 80053 COMPREHEN METABOLIC PANEL: CPT | Performed by: NURSE PRACTITIONER

## 2017-05-18 PROCEDURE — 85025 COMPLETE CBC W/AUTO DIFF WBC: CPT | Performed by: NURSE PRACTITIONER

## 2017-06-06 DIAGNOSIS — E03.9 ACQUIRED HYPOTHYROIDISM: ICD-10-CM

## 2017-06-06 RX ORDER — PHENTERMINE HYDROCHLORIDE 30 MG/1
30 CAPSULE ORAL EVERY MORNING
Qty: 30 CAPSULE | Refills: 0 | Status: SHIPPED | OUTPATIENT
Start: 2017-06-06 | End: 2018-11-07

## 2017-06-06 RX ORDER — LEVOTHYROXINE SODIUM 0.1 MG/1
100 TABLET ORAL DAILY
Qty: 90 TABLET | Refills: 3 | Status: SHIPPED | OUTPATIENT
Start: 2017-06-06 | End: 2017-06-06 | Stop reason: SDUPTHER

## 2017-06-06 RX ORDER — LEVOTHYROXINE SODIUM 0.1 MG/1
100 TABLET ORAL DAILY
Qty: 90 TABLET | Refills: 3 | Status: SHIPPED | OUTPATIENT
Start: 2017-06-06 | End: 2018-11-07 | Stop reason: SDUPTHER

## 2017-07-25 DIAGNOSIS — R30.0 DYSURIA: Primary | ICD-10-CM

## 2017-07-26 ENCOUNTER — LAB (OUTPATIENT)
Dept: LAB | Facility: OTHER | Age: 22
End: 2017-07-26

## 2017-07-26 DIAGNOSIS — R30.0 DYSURIA: ICD-10-CM

## 2017-07-26 LAB
BILIRUB UR QL STRIP: NEGATIVE
CLARITY UR: ABNORMAL
COLOR UR: YELLOW
GLUCOSE UR STRIP-MCNC: NEGATIVE MG/DL
HGB UR QL STRIP.AUTO: NEGATIVE
KETONES UR QL STRIP: NEGATIVE
LEUKOCYTE ESTERASE UR QL STRIP.AUTO: NEGATIVE
NITRITE UR QL STRIP: NEGATIVE
PH UR STRIP.AUTO: 5.5 [PH] (ref 5.5–8)
PROT UR QL STRIP: NEGATIVE
SP GR UR STRIP: 1.02 (ref 1–1.03)
UROBILINOGEN UR QL STRIP: ABNORMAL

## 2017-07-26 PROCEDURE — 81003 URINALYSIS AUTO W/O SCOPE: CPT | Performed by: NURSE PRACTITIONER

## 2018-02-12 RX ORDER — DOXYCYCLINE HYCLATE 100 MG/1
100 TABLET, DELAYED RELEASE ORAL 2 TIMES DAILY
Qty: 14 TABLET | Refills: 0 | Status: SHIPPED | OUTPATIENT
Start: 2018-02-12 | End: 2018-11-07

## 2018-03-07 DIAGNOSIS — E88.81 METABOLIC SYNDROME: ICD-10-CM

## 2018-03-07 DIAGNOSIS — E55.9 VITAMIN D DEFICIENCY: Primary | ICD-10-CM

## 2018-03-07 DIAGNOSIS — E03.9 ACQUIRED HYPOTHYROIDISM: ICD-10-CM

## 2018-03-08 ENCOUNTER — LAB (OUTPATIENT)
Dept: LAB | Facility: OTHER | Age: 23
End: 2018-03-08

## 2018-03-08 DIAGNOSIS — E03.9 ACQUIRED HYPOTHYROIDISM: ICD-10-CM

## 2018-03-08 DIAGNOSIS — E55.9 VITAMIN D DEFICIENCY: ICD-10-CM

## 2018-03-08 DIAGNOSIS — E88.81 METABOLIC SYNDROME: ICD-10-CM

## 2018-03-08 LAB
25(OH)D3 SERPL-MCNC: 18.1 NG/ML (ref 30–100)
ALBUMIN SERPL-MCNC: 3.8 G/DL (ref 3.2–5.5)
ALBUMIN/GLOB SERPL: 1.1 G/DL (ref 1–3)
ALP SERPL-CCNC: 53 U/L (ref 15–121)
ALT SERPL W P-5'-P-CCNC: 32 U/L (ref 10–60)
ANION GAP SERPL CALCULATED.3IONS-SCNC: 7 MMOL/L (ref 5–15)
AST SERPL-CCNC: 26 U/L (ref 10–60)
BILIRUB SERPL-MCNC: 0.6 MG/DL (ref 0.2–1)
BUN BLD-MCNC: 15 MG/DL (ref 8–25)
BUN/CREAT SERPL: 21.4 (ref 7–25)
CALCIUM SPEC-SCNC: 8.7 MG/DL (ref 8.4–10.8)
CHLORIDE SERPL-SCNC: 106 MMOL/L (ref 100–112)
CHOLEST SERPL-MCNC: 128 MG/DL (ref 150–200)
CO2 SERPL-SCNC: 26 MMOL/L (ref 20–32)
CREAT BLD-MCNC: 0.7 MG/DL (ref 0.4–1.3)
GFR SERPL CREATININE-BSD FRML MDRD: 103 ML/MIN/1.73 (ref 71–165)
GLOBULIN UR ELPH-MCNC: 3.5 GM/DL (ref 2.5–4.6)
GLUCOSE BLD-MCNC: 110 MG/DL (ref 70–100)
HBA1C MFR BLD: 5.5 % (ref 4–5.6)
HDLC SERPL-MCNC: 53 MG/DL (ref 35–100)
LDLC SERPL CALC-MCNC: 64 MG/DL
LDLC/HDLC SERPL: 1.2 {RATIO}
POTASSIUM BLD-SCNC: 4.1 MMOL/L (ref 3.4–5.4)
PROT SERPL-MCNC: 7.3 G/DL (ref 6.7–8.2)
SODIUM BLD-SCNC: 139 MMOL/L (ref 134–146)
TRIGL SERPL-MCNC: 57 MG/DL (ref 35–160)
TSH SERPL DL<=0.05 MIU/L-ACNC: 8.35 MIU/ML (ref 0.46–4.68)
VLDLC SERPL-MCNC: 11.4 MG/DL

## 2018-03-08 PROCEDURE — 83525 ASSAY OF INSULIN: CPT | Performed by: NURSE PRACTITIONER

## 2018-03-08 PROCEDURE — 80061 LIPID PANEL: CPT | Performed by: NURSE PRACTITIONER

## 2018-03-08 PROCEDURE — 83036 HEMOGLOBIN GLYCOSYLATED A1C: CPT | Performed by: NURSE PRACTITIONER

## 2018-03-08 PROCEDURE — 80053 COMPREHEN METABOLIC PANEL: CPT | Performed by: NURSE PRACTITIONER

## 2018-03-08 PROCEDURE — 82306 VITAMIN D 25 HYDROXY: CPT | Performed by: NURSE PRACTITIONER

## 2018-03-08 PROCEDURE — 84443 ASSAY THYROID STIM HORMONE: CPT | Performed by: NURSE PRACTITIONER

## 2018-03-09 LAB — INSULIN SERPL-ACNC: 54.9 UIU/ML (ref 2.6–24.9)

## 2018-11-07 ENCOUNTER — OFFICE VISIT (OUTPATIENT)
Dept: FAMILY MEDICINE CLINIC | Facility: CLINIC | Age: 23
End: 2018-11-07

## 2018-11-07 VITALS
HEIGHT: 67 IN | WEIGHT: 293 LBS | SYSTOLIC BLOOD PRESSURE: 112 MMHG | HEART RATE: 101 BPM | OXYGEN SATURATION: 99 % | DIASTOLIC BLOOD PRESSURE: 90 MMHG | RESPIRATION RATE: 18 BRPM | BODY MASS INDEX: 45.99 KG/M2 | TEMPERATURE: 100.5 F

## 2018-11-07 DIAGNOSIS — R11.2 NAUSEA AND VOMITING, INTRACTABILITY OF VOMITING NOT SPECIFIED, UNSPECIFIED VOMITING TYPE: ICD-10-CM

## 2018-11-07 DIAGNOSIS — E03.9 ACQUIRED HYPOTHYROIDISM: ICD-10-CM

## 2018-11-07 DIAGNOSIS — M54.50 ACUTE BILATERAL LOW BACK PAIN WITHOUT SCIATICA: ICD-10-CM

## 2018-11-07 DIAGNOSIS — R10.30 LOWER ABDOMINAL PAIN: Primary | ICD-10-CM

## 2018-11-07 DIAGNOSIS — R10.2 PELVIC PAIN: ICD-10-CM

## 2018-11-07 LAB
ALBUMIN SERPL-MCNC: 4.3 G/DL (ref 3.5–5)
ALBUMIN/GLOB SERPL: 1.1 G/DL (ref 1.1–1.8)
ALP SERPL-CCNC: 67 U/L (ref 38–126)
ALT SERPL W P-5'-P-CCNC: 64 U/L
AMYLASE SERPL-CCNC: 76 U/L (ref 30–110)
ANION GAP SERPL CALCULATED.3IONS-SCNC: 8 MMOL/L (ref 5–15)
AST SERPL-CCNC: 47 U/L (ref 14–36)
B-HCG UR QL: NEGATIVE
BASOPHILS # BLD AUTO: 0.02 10*3/MM3 (ref 0–0.2)
BASOPHILS NFR BLD AUTO: 0.2 % (ref 0–2)
BILIRUB SERPL-MCNC: 0.5 MG/DL (ref 0.2–1.3)
BILIRUB UR QL STRIP: NEGATIVE
BUN BLD-MCNC: 13 MG/DL (ref 7–17)
BUN/CREAT SERPL: 13.5 (ref 7–25)
CALCIUM SPEC-SCNC: 9.2 MG/DL (ref 8.4–10.2)
CHLORIDE SERPL-SCNC: 108 MMOL/L (ref 98–107)
CLARITY UR: CLEAR
CO2 SERPL-SCNC: 28 MMOL/L (ref 22–30)
COLOR UR: YELLOW
CREAT BLD-MCNC: 0.96 MG/DL (ref 0.52–1.04)
DEPRECATED RDW RBC AUTO: 44.5 FL (ref 36.4–46.3)
EOSINOPHIL # BLD AUTO: 0.18 10*3/MM3 (ref 0–0.7)
EOSINOPHIL NFR BLD AUTO: 1.4 % (ref 0–7)
ERYTHROCYTE [DISTWIDTH] IN BLOOD BY AUTOMATED COUNT: 13.3 % (ref 11.5–14.5)
ERYTHROCYTE [SEDIMENTATION RATE] IN BLOOD: 20 MM/HR (ref 0–20)
GFR SERPL CREATININE-BSD FRML MDRD: 71 ML/MIN/1.73 (ref 71–165)
GLOBULIN UR ELPH-MCNC: 4 GM/DL (ref 2.3–3.5)
GLUCOSE BLD-MCNC: 101 MG/DL (ref 74–99)
GLUCOSE UR STRIP-MCNC: NEGATIVE MG/DL
HCT VFR BLD AUTO: 44.4 % (ref 35–45)
HGB BLD-MCNC: 14.6 G/DL (ref 12–15.5)
HGB UR QL STRIP.AUTO: NEGATIVE
KETONES UR QL STRIP: NEGATIVE
LEUKOCYTE ESTERASE UR QL STRIP.AUTO: NEGATIVE
LIPASE SERPL-CCNC: 94 U/L (ref 23–300)
LYMPHOCYTES # BLD AUTO: 2.41 10*3/MM3 (ref 0.6–4.2)
LYMPHOCYTES NFR BLD AUTO: 18.5 % (ref 10–50)
MCH RBC QN AUTO: 30.9 PG (ref 26.5–34)
MCHC RBC AUTO-ENTMCNC: 32.9 G/DL (ref 31.4–36)
MCV RBC AUTO: 94.1 FL (ref 80–98)
MONOCYTES # BLD AUTO: 1.01 10*3/MM3 (ref 0–0.9)
MONOCYTES NFR BLD AUTO: 7.8 % (ref 0–12)
NEUTROPHILS # BLD AUTO: 9.39 10*3/MM3 (ref 2–8.6)
NEUTROPHILS NFR BLD AUTO: 72.1 % (ref 37–80)
NITRITE UR QL STRIP: NEGATIVE
PH UR STRIP.AUTO: 5.5 [PH] (ref 5.5–8)
PLATELET # BLD AUTO: 261 10*3/MM3 (ref 150–450)
PMV BLD AUTO: 11.3 FL (ref 8–12)
POTASSIUM BLD-SCNC: 4.5 MMOL/L (ref 3.4–5)
PROT SERPL-MCNC: 8.3 G/DL (ref 6.3–8.2)
PROT UR QL STRIP: NEGATIVE
RBC # BLD AUTO: 4.72 10*6/MM3 (ref 3.77–5.16)
SODIUM BLD-SCNC: 144 MMOL/L (ref 137–145)
SP GR UR STRIP: 1.02 (ref 1–1.03)
UROBILINOGEN UR QL STRIP: NORMAL
WBC NRBC COR # BLD: 13.01 10*3/MM3 (ref 3.2–9.8)

## 2018-11-07 PROCEDURE — 83690 ASSAY OF LIPASE: CPT | Performed by: NURSE PRACTITIONER

## 2018-11-07 PROCEDURE — 85025 COMPLETE CBC W/AUTO DIFF WBC: CPT | Performed by: NURSE PRACTITIONER

## 2018-11-07 PROCEDURE — 81025 URINE PREGNANCY TEST: CPT | Performed by: NURSE PRACTITIONER

## 2018-11-07 PROCEDURE — 99214 OFFICE O/P EST MOD 30 MIN: CPT | Performed by: NURSE PRACTITIONER

## 2018-11-07 PROCEDURE — 80053 COMPREHEN METABOLIC PANEL: CPT | Performed by: NURSE PRACTITIONER

## 2018-11-07 PROCEDURE — 82150 ASSAY OF AMYLASE: CPT | Performed by: NURSE PRACTITIONER

## 2018-11-07 PROCEDURE — 85651 RBC SED RATE NONAUTOMATED: CPT | Performed by: NURSE PRACTITIONER

## 2018-11-07 RX ORDER — METHYLPREDNISOLONE 4 MG/1
TABLET ORAL
Qty: 1 EACH | Refills: 0 | Status: SHIPPED | OUTPATIENT
Start: 2018-11-07 | End: 2018-11-16

## 2018-11-07 RX ORDER — SUCRALFATE 1 G/1
1 TABLET ORAL 3 TIMES DAILY PRN
Qty: 30 TABLET | Refills: 0 | Status: SHIPPED | OUTPATIENT
Start: 2018-11-07 | End: 2018-11-16

## 2018-11-07 RX ORDER — DICYCLOMINE HYDROCHLORIDE 10 MG/1
10 CAPSULE ORAL 3 TIMES DAILY PRN
Qty: 30 CAPSULE | Refills: 0 | Status: SHIPPED | OUTPATIENT
Start: 2018-11-07 | End: 2018-11-16

## 2018-11-07 RX ORDER — LEVOTHYROXINE SODIUM 0.1 MG/1
100 TABLET ORAL DAILY
Qty: 90 TABLET | Refills: 3 | Status: SHIPPED | OUTPATIENT
Start: 2018-11-07 | End: 2018-11-26 | Stop reason: SDUPTHER

## 2018-11-07 RX ORDER — OMEPRAZOLE 40 MG/1
40 CAPSULE, DELAYED RELEASE ORAL DAILY
Qty: 30 CAPSULE | Refills: 0 | Status: SHIPPED | OUTPATIENT
Start: 2018-11-07 | End: 2018-11-16 | Stop reason: SDUPTHER

## 2018-11-07 NOTE — PROGRESS NOTES
Subjective   Elysia Hernandez is a 23 y.o. female who presents to the office for abdominal pain.    History of Present Illness     Patient comes in today with chief complaint of lower abdominal pain and low back pain since Sunday.  She states pain is getting worse.  Accompanied by diaphoresis.  Constantly, one episode of vomiting on Monday, and nausea off and on.  States pain is very low abdomen, almost pelvic region, constant achy pain, crampy-like with random sharp pains.  Tuesday she had constipation, but today she had several bowel movements, however, none of them or diarrhea.  She describes low back pain as achy type pain radiating across her lower back.  States history of blood in stool at times with severe constipation.  Patient states pain is not affected by food and is currently keeping food and fluids down.  Patient states that for the past year or so  She has had abnormal bowel movements where she fluctuates between constipation and diarrhea and crampy-like abdominal pain.  States the intensity and frequency of this has worsened.  Patient states surgical history of abscess removed off her left ovary in May 2017 by Dr. Florez, history seen [facility] and states that she said that she has not PCO S.  She states that she was told her tubes or so scarred that she might have trouble getting pregnant, however, she is not trying to not get pregnant.  Patient also complains of pain with intercourse at times and bleeding after intercourse at times.  History of pelvic inflammatory disease last year.  Patient states her periods normally come at the same time every month but occasionally she misses one of them.  They last a week or longer.  They are painful but not very heavy.    Hypothyroidism-chronic, controlled on levothyroxine 100 MCG's daily.  -Thyroid levels, last checked on 3/8/18 and they were abnormal.    Family history:  -Mom-diabetes  -Mom's mom-cancer, unsure of what type    Surgical  history:  -Abscess removed off left ovary May 2017    Past medical history:  -Hypothyroidism-chronic, controlled on Synthroid 100 MCG daily.  -Pelvic inflammatory disease 2017    The following portions of the patient's history were reviewed and updated as appropriate: allergies, current medications, past family history, past medical history, past social history, past surgical history and problem list.    Review of Systems   Constitutional: Positive for activity change and diaphoresis. Negative for appetite change, chills, fatigue, fever and unexpected weight change.   HENT: Negative for congestion, ear discharge, ear pain, nosebleeds, postnasal drip, rhinorrhea, sinus pain, sinus pressure, sneezing, sore throat, tinnitus and trouble swallowing.    Eyes: Negative.    Respiratory: Negative for cough, chest tightness, shortness of breath and wheezing.    Cardiovascular: Negative for chest pain, palpitations and leg swelling.   Gastrointestinal: Positive for abdominal pain, blood in stool, constipation, diarrhea, nausea and vomiting. Negative for abdominal distention.   Genitourinary: Negative for difficulty urinating, dyspareunia, dysuria, flank pain, frequency, hematuria, menstrual problem, vaginal bleeding, vaginal discharge and vaginal pain.   Musculoskeletal: Positive for back pain. Negative for arthralgias, gait problem, joint swelling and myalgias.   Skin: Negative for rash and wound.   Allergic/Immunologic: Negative for environmental allergies, food allergies and immunocompromised state.   Neurological: Negative for dizziness, tremors, seizures, syncope, weakness, light-headedness, numbness and headaches.   Hematological: Does not bruise/bleed easily.   Psychiatric/Behavioral: Negative for behavioral problems, self-injury, sleep disturbance and suicidal ideas. The patient is not nervous/anxious.        Past Medical History:   Diagnosis Date   • Acne    • Acquired hypothyroidism    • Acute pharyngitis    •  "Amenorrhea    • Carbuncle of groin    • Excessive and frequent menstruation with irregular cycle    • FH: blood disorder    • Hidradenitis suppurativa    • Hirsutism    • Hypothyroidism    • Irregular periods    • Metabolic syndrome X    • Obesity    • Unspecified vitamin D deficiency        Family History   Problem Relation Age of Onset   • No Known Problems Other    • Hypertension Father    • Diabetes Mother    • Hypertension Mother    • Hyperlipidemia Mother    • No Known Problems Sister           Objective   /90   Pulse 101   Temp 100.5 °F (38.1 °C) (Temporal Artery )   Resp 18   Ht 170.2 cm (67\")   Wt (!) 156 kg (345 lb)   SpO2 99%   Breastfeeding? No   BMI 54.03 kg/m²   Physical Exam   Constitutional: She is oriented to person, place, and time. She appears well-developed and well-nourished. She is cooperative. No distress.   Cardiovascular: Normal rate, regular rhythm, normal heart sounds and intact distal pulses.  Exam reveals no gallop and no friction rub.    No murmur heard.  Pulmonary/Chest: Effort normal and breath sounds normal. No respiratory distress. She has no wheezes. She has no rales.   Abdominal: Soft. Normal appearance and bowel sounds are normal. She exhibits no shifting dullness, no distension, no pulsatile liver, no fluid wave, no abdominal bruit, no ascites, no pulsatile midline mass and no mass. There is no hepatosplenomegaly. There is tenderness in the right lower quadrant, suprapubic area and left lower quadrant. There is no rigidity, no rebound, no guarding, no CVA tenderness, no tenderness at McBurney's point and negative Jain's sign. No hernia.   Neurological: She is alert and oriented to person, place, and time. She is not disoriented. Coordination and gait normal.   Skin: Skin is warm and dry.   Psychiatric: She has a normal mood and affect. Her speech is normal and behavior is normal. She is not actively hallucinating. She is attentive.   Nursing note and vitals " reviewed.       No flowsheet data found.      Assessment/Plan   Elysia was seen today for abdominal pain.    Diagnoses and all orders for this visit:    Lower abdominal pain  -     CBC & Differential  -     Comprehensive Metabolic Panel  -     Amylase  -     Lipase  -     Urinalysis With Culture If Indicated - Urine, Clean Catch  -     Sedimentation Rate  -     Pregnancy, Urine - Urine, Clean Catch  -     Cancel: XR abdomen 3 vw  -     XR Abdomen KUB  -     CBC Auto Differential  -     CT abdomen pelvis w contrast    Nausea and vomiting, intractability of vomiting not specified, unspecified vomiting type  -     CBC & Differential  -     Comprehensive Metabolic Panel  -     Amylase  -     Lipase  -     Urinalysis With Culture If Indicated - Urine, Clean Catch  -     Sedimentation Rate  -     Pregnancy, Urine - Urine, Clean Catch  -     Cancel: XR abdomen 3 vw  -     XR Abdomen KUB  -     CBC Auto Differential  -     CT abdomen pelvis w contrast    Pelvic pain  -     CBC & Differential  -     Comprehensive Metabolic Panel  -     Amylase  -     Lipase  -     Urinalysis With Culture If Indicated - Urine, Clean Catch  -     Sedimentation Rate  -     Pregnancy, Urine - Urine, Clean Catch  -     Cancel: XR abdomen 3 vw  -     XR Abdomen KUB  -     CBC Auto Differential  -     CT abdomen pelvis w contrast    Acute bilateral low back pain without sciatica    Acquired hypothyroidism  -     levothyroxine (SYNTHROID) 100 MCG tablet; Take 1 tablet by mouth Daily.    Other orders  -     omeprazole (priLOSEC) 40 MG capsule; Take 1 capsule by mouth Daily.  -     sucralfate (CARAFATE) 1 g tablet; Take 1 tablet by mouth 3 (Three) Times a Day As Needed (stomach pain).  -     dicyclomine (BENTYL) 10 MG capsule; Take 1 capsule by mouth 3 (Three) Times a Day As Needed (abdominal cramping).  -     MethylPREDNISolone (MEDROL, CHRISTA,) 4 MG tablet; Take as directed on package instructions.           Patient complaining of lower abdominal  pain/pelvic pain and low back pain bilaterally without sciatica, accompanied by nausea and vomiting.  For several days.  Did stat pregnancy urine test as patient has not 100% sure she was not pregnant.  Urine pregnancy test negative.  Ordered stat CBC, CMP, amylase, UA, x-ray of abdomen and KUB.  Labs and x-ray did not show anything significant except for mildly elevated white blood cell count, monocytes, and neutrophils.  Will call patient about lipase and sedimentation rate levels when the result.  Discussed with patient.  Plan of care: Alternate Carafate and Bentyl for symptoms, added omeprazole as acid reducer.  Medrol Dosepak for possible inflammation.  Pt would like to avoid antibiotic for now, patient does have a history of diverticulitis, so I suggested we do CT scan of abdomen and pelvis with contrast tomorrow.  Will follow-up after results.    Hypothyroidism-chronic, controlled with Synthroid 100 MCG daily.  -Educated patient that we will need to do fasting thyroid labs in the near future as she has not had this checked in over 6 months and last labs were unstable.    Patient educated to follow-up sooner than next scheduled appointment if condition(s) worse or do not improve. Patient states understanding and is in agreeance with plan of care. An After Visit Summary was printed and given to the patient.      BHARAT Rubio        This document has been electronically signed by BHARAT Rubio on November 7, 2018 4:57 PM      EMR/Transcription Dragon Disclaimer:  Some of this note may be an electronic dragon transcription/translation of spoken language to printed text. The electronic translation of spoken language may permit erroneous, or at times, nonsensical words or phrases to be inadvertently transcribed. Although I have reviewed the note for such errors, some may still exist.

## 2018-11-08 DIAGNOSIS — N93.0 BLEEDING AFTER INTERCOURSE: ICD-10-CM

## 2018-11-08 DIAGNOSIS — K57.92 DIVERTICULITIS: Primary | ICD-10-CM

## 2018-11-08 DIAGNOSIS — N94.10 PAIN IN FEMALE GENITALIA ON INTERCOURSE: ICD-10-CM

## 2018-11-08 RX ORDER — SULFAMETHOXAZOLE AND TRIMETHOPRIM 800; 160 MG/1; MG/1
1 TABLET ORAL 2 TIMES DAILY
Qty: 14 TABLET | Refills: 0 | Status: SHIPPED | OUTPATIENT
Start: 2018-11-08 | End: 2018-11-16

## 2018-11-08 RX ORDER — METRONIDAZOLE 500 MG/1
500 TABLET ORAL 3 TIMES DAILY
Qty: 21 TABLET | Refills: 0 | Status: SHIPPED | OUTPATIENT
Start: 2018-11-08 | End: 2018-11-16

## 2018-11-14 ENCOUNTER — OFFICE VISIT (OUTPATIENT)
Dept: OBSTETRICS AND GYNECOLOGY | Facility: CLINIC | Age: 23
End: 2018-11-14

## 2018-11-14 VITALS
HEART RATE: 97 BPM | SYSTOLIC BLOOD PRESSURE: 120 MMHG | RESPIRATION RATE: 18 BRPM | DIASTOLIC BLOOD PRESSURE: 88 MMHG | BODY MASS INDEX: 45.99 KG/M2 | HEIGHT: 67 IN | WEIGHT: 293 LBS

## 2018-11-14 DIAGNOSIS — N93.0 BLEEDING AFTER INTERCOURSE: Primary | ICD-10-CM

## 2018-11-14 DIAGNOSIS — R10.2 PELVIC PAIN: ICD-10-CM

## 2018-11-14 PROCEDURE — 87210 SMEAR WET MOUNT SALINE/INK: CPT | Performed by: NURSE PRACTITIONER

## 2018-11-14 PROCEDURE — 99213 OFFICE O/P EST LOW 20 MIN: CPT | Performed by: NURSE PRACTITIONER

## 2018-11-16 ENCOUNTER — OFFICE VISIT (OUTPATIENT)
Dept: FAMILY MEDICINE CLINIC | Facility: CLINIC | Age: 23
End: 2018-11-16

## 2018-11-16 VITALS
DIASTOLIC BLOOD PRESSURE: 60 MMHG | HEART RATE: 83 BPM | OXYGEN SATURATION: 98 % | HEIGHT: 67 IN | TEMPERATURE: 98.5 F | BODY MASS INDEX: 45.99 KG/M2 | WEIGHT: 293 LBS | RESPIRATION RATE: 16 BRPM | SYSTOLIC BLOOD PRESSURE: 110 MMHG

## 2018-11-16 DIAGNOSIS — K57.92 DIVERTICULITIS: Primary | ICD-10-CM

## 2018-11-16 DIAGNOSIS — Z13.0 SCREENING FOR DEFICIENCY ANEMIA: ICD-10-CM

## 2018-11-16 DIAGNOSIS — E16.1 HYPERINSULINEMIA: ICD-10-CM

## 2018-11-16 DIAGNOSIS — E03.9 HYPOTHYROIDISM, UNSPECIFIED TYPE: ICD-10-CM

## 2018-11-16 LAB
HBA1C MFR BLD: 5.7 % (ref 4–5.6)
T4 FREE SERPL-MCNC: 1.21 NG/DL (ref 0.78–2.19)
TSH SERPL DL<=0.05 MIU/L-ACNC: 3.04 MIU/ML (ref 0.46–4.68)

## 2018-11-16 PROCEDURE — 83525 ASSAY OF INSULIN: CPT | Performed by: NURSE PRACTITIONER

## 2018-11-16 PROCEDURE — 84439 ASSAY OF FREE THYROXINE: CPT | Performed by: NURSE PRACTITIONER

## 2018-11-16 PROCEDURE — 99214 OFFICE O/P EST MOD 30 MIN: CPT | Performed by: NURSE PRACTITIONER

## 2018-11-16 PROCEDURE — 84443 ASSAY THYROID STIM HORMONE: CPT | Performed by: NURSE PRACTITIONER

## 2018-11-16 PROCEDURE — 36415 COLL VENOUS BLD VENIPUNCTURE: CPT | Performed by: NURSE PRACTITIONER

## 2018-11-16 PROCEDURE — 83527 ASSAY OF INSULIN: CPT | Performed by: NURSE PRACTITIONER

## 2018-11-16 PROCEDURE — 83036 HEMOGLOBIN GLYCOSYLATED A1C: CPT | Performed by: NURSE PRACTITIONER

## 2018-11-16 RX ORDER — OMEPRAZOLE 40 MG/1
40 CAPSULE, DELAYED RELEASE ORAL DAILY
Qty: 30 CAPSULE | Refills: 5 | Status: SHIPPED | OUTPATIENT
Start: 2018-11-16 | End: 2019-01-21

## 2018-11-16 RX ORDER — DICYCLOMINE HCL 20 MG
20 TABLET ORAL EVERY 6 HOURS PRN
Qty: 60 TABLET | Refills: 5 | Status: SHIPPED | OUTPATIENT
Start: 2018-11-16 | End: 2018-12-05

## 2018-11-16 NOTE — PROGRESS NOTES
Subjective   Elysia Hernandez is a 23 y.o. female.     History of Present Illness   Pt presents to discuss pelvic pain and scant pink bleeding after intercourse. Pt was seen last week for a flare of diverticulitis. CT scan performed 11/8/18. She has had this in the past resulting in abscess and scarring in the abdomen and pelvis. She describes the pain with intercourse, deep, sporadic and not every time, worse last week and has improved some with antibiotics for diverticulitis.     Pt noticed light pink spotting after intercourse several times in a row. Denies superficial pain or tearing. Denies discharge, itching, burning, dysuria, urgency, frequency. UA on 11/7 was negative.     Periods have been slightly irregular. Patient's last menstrual period was 10/14/2018 (approximate). typically 5-7 days, light to heavy flow, moderate cramps, nausea and headaches.     The following portions of the patient's history were reviewed and updated as appropriate: allergies, current medications, past family history, past medical history, past social history, past surgical history and problem list.    Review of Systems   Constitutional: Negative.  Negative for chills and fever.   Respiratory: Negative.    Cardiovascular: Negative.    Gastrointestinal: Positive for abdominal pain (improving with treatment for diverticulitis). Negative for nausea and vomiting.   Genitourinary: Positive for menstrual problem (PCOS, irregular), pelvic pain and vaginal bleeding (spotting after intercourse). Negative for difficulty urinating, dyspareunia, dysuria, flank pain, frequency, genital sores, hematuria, urgency, vaginal discharge and vaginal pain.   Neurological: Negative for dizziness, syncope and light-headedness.       Objective    Vitals:    11/14/18 1042   BP: 120/88   Pulse: 97   Resp: 18         11/14/18  1042   Weight: (!) 156 kg (344 lb 12.8 oz)     Body mass index is 54 kg/m².    Physical Exam   Constitutional: She is oriented to  person, place, and time. She appears well-developed and well-nourished. She is morbidly obese.  Cardiovascular: Normal rate, regular rhythm and normal heart sounds.   Pulmonary/Chest: Effort normal and breath sounds normal.   Genitourinary: Cervix normal. There is no rash, tenderness or lesion on the right labia. There is no rash, tenderness or lesion on the left labia. Cervix does not exhibit motion tenderness, discharge or polyp. Right adnexum is non-palpable.Left adnexum is non-palpable.No erythema, tenderness or bleeding in the vagina. No foreign body in the vagina. No signs of injury around the vagina. No vaginal discharge found.   Genitourinary Comments: Uterus and ovaries not palpable due to body habitus. Nothing visually to suggest origin of bleeding after sex. Pelvic pain not reproduced on exam.     Wet prep: negative for clue cells, yeast buds, hyphae or trichomonads. Evaluated by ANANDA Dalal.    Neurological: She is alert and oriented to person, place, and time.   Psychiatric: She has a normal mood and affect. Her behavior is normal.   Vitals reviewed.    PROCEDURE: Ct abdomen and pelvis with contrast     REASON FOR EXAM: Abdominal pain, unspecified  , R10.30 Lower abdominal pain, unspecified R11.2 Nausea with  vomiting, unspecified R10.2 Pelvic and perineal pain: LLQ pain,  suspect diverticulitis  Pelvic pain, neg HCG, non-gyn  Abd pain, gastroenteritis or colitis suspected     FINDINGS: Comparison exam dated March 6, 2017. Axial computer  tomography sequential imaging was performed from the diaphragms  through the symphysis pubis with IV contrast administration.  Sagittal and coronal reformates was performed. This exam was  performed according to our departmental dose optimization  program, which includes automated exposure control, adjustment of  the mA and/or KV according to patient size and/or use of  iterative reconstruction technique.      Imaging through lung bases reveals no  abnormality.     Diffuse low liver attenuation consistent with changes from  hepatic steatosis. The liver is otherwise unremarkable. The  gallbladder is normal. The biliary system is normal. The pancreas  is normal. The spleen is diffusely enlarged measuring 13.3 cm in  greatest craniocaudal dimension. Otherwise spleen is  unremarkable. Bilateral adrenal glands are normal. Right kidney  and ureter are normal. Left kidney and ureter are normal. The  bladder is normal. The uterus appears within normal limits. Right  ovarian 2.1 x 2.8 x 2.2 cm simple physiologic cyst. Otherwise the  right ovary is unremarkable. Left ovarian 2 simple physiologic  cysts with the largest measuring 2.1 cm in greatest diameter.  Lower sigmoid colon diverticula with abnormal enhancing rim and  surrounding mild fatty mesentery stranding. These inflammatory  changes secondarily involve the left ovarian region. Additional  sigmoid colon region of multiple diverticula with diffuse  thickening of the walls and mild surrounding fatty mesentery  stranding. The hollow viscera is otherwise unremarkable. No  lymphadenopathy in the abdomen or the pelvis. No acute osseous  abnormality.     IMPRESSION:  1. Hepatic steatosis..  2. Splenomegaly..  3. Bilateral ovarian small simple physiologic cysts described  above..  4. Lower sigmoid colon diverticula with abnormal enhancing rim  and surrounding fatty stranding with these inflammatory changes  extending into the region of the left ovary. These findings would  be suspicious for acute diverticulitis. This focus of  diverticulitis is very close to the vagina and may cause painful  intercourse.  5. Additional sigmoid colon foci of abnormal wall thickening and  surrounding fatty mesentery stranding suspicious for acute  diverticulitis..  6. Referring clinician notified of findings by phone at 11:01 AM  on 11/8/2018.     Electronically signed by:  Zak Lopez MD  11/8/2018 11:09 AM CST  Workstation:  [id number]    Assessment/Plan   Elysia was seen today for pelvic pain and bleeding after intercourse.    Diagnoses and all orders for this visit:    Bleeding after intercourse    Pelvic pain    Discussed CT findings with pt. There is suggestion that the inflammatory bowel changes were laying near the vaginal wall which could cause painful intercourse and pelvic pain. Based on improving pain with antibiotics, and no bleeding in the last few days or on exam, I recommend that we monitor. Her pain may have first been a result of the diverticulitis. Bleeding may have just been vaginal irritation. Flagyl may have treated an undiagnosed vaginitis that would cause pink spotting. If pelvic pain persists after completing antibiotic regimen, RTC for a pelvic ultrasound. Pt agrees with plan of care. All questions answered.

## 2018-11-24 LAB
INSULIN FREE SERPL-ACNC: 46 UU/ML
INSULIN SERPL-ACNC: 46 UU/ML

## 2018-11-26 DIAGNOSIS — E03.9 ACQUIRED HYPOTHYROIDISM: ICD-10-CM

## 2018-11-26 RX ORDER — LEVOTHYROXINE SODIUM 0.1 MG/1
100 TABLET ORAL DAILY
Qty: 90 TABLET | Refills: 3 | Status: SHIPPED | OUTPATIENT
Start: 2018-11-26

## 2018-12-05 ENCOUNTER — OFFICE VISIT (OUTPATIENT)
Dept: GASTROENTEROLOGY | Facility: CLINIC | Age: 23
End: 2018-12-05

## 2018-12-05 VITALS
SYSTOLIC BLOOD PRESSURE: 130 MMHG | WEIGHT: 293 LBS | HEART RATE: 97 BPM | BODY MASS INDEX: 45.99 KG/M2 | HEIGHT: 67 IN | DIASTOLIC BLOOD PRESSURE: 92 MMHG

## 2018-12-05 DIAGNOSIS — K57.92 DIVERTICULITIS: Primary | ICD-10-CM

## 2018-12-05 DIAGNOSIS — Z80.0 FAMILY HISTORY OF COLON CANCER: ICD-10-CM

## 2018-12-05 DIAGNOSIS — R10.32 LEFT LOWER QUADRANT PAIN: ICD-10-CM

## 2018-12-05 PROCEDURE — 99214 OFFICE O/P EST MOD 30 MIN: CPT | Performed by: INTERNAL MEDICINE

## 2018-12-05 RX ORDER — METRONIDAZOLE 500 MG/1
500 TABLET ORAL 3 TIMES DAILY
Qty: 42 TABLET | Refills: 0 | Status: SHIPPED | OUTPATIENT
Start: 2018-12-05 | End: 2018-12-19

## 2018-12-05 RX ORDER — CIPROFLOXACIN 500 MG/1
500 TABLET, FILM COATED ORAL EVERY 12 HOURS SCHEDULED
Status: DISCONTINUED | OUTPATIENT
Start: 2018-12-05 | End: 2018-12-19

## 2018-12-05 NOTE — PATIENT INSTRUCTIONS
Diverticulitis  Diverticulitis is when small pockets in your large intestine (colon) get infected or swollen. This causes stomach pain and watery poop (diarrhea).  These pouches are called diverticula. They form in people who have a condition called diverticulosis.  Follow these instructions at home:  Medicines  · Take over-the-counter and prescription medicines only as told by your doctor. These include:  ? Antibiotics.  ? Pain medicines.  ? Fiber pills.  ? Probiotics.  ? Stool softeners.  · Do not drive or use heavy machinery while taking prescription pain medicine.  · If you were prescribed an antibiotic, take it as told. Do not stop taking it even if you feel better.  General instructions  · Follow a diet as told by your doctor.  · When you feel better, your doctor may tell you to change your diet. You may need to eat a lot of fiber. Fiber makes it easier to poop (have bowel movements). Healthy foods with fiber include:  ? Berries.  ? Beans.  ? Lentils.  ? Green vegetables.  · Exercise 3 or more times a week. Aim for 30 minutes each time. Exercise enough to sweat and make your heart beat faster.  · Keep all follow-up visits as told. This is important. You may need to have an exam of the large intestine. This is called a colonoscopy.  Contact a doctor if:  · Your pain does not get better.  · You have a hard time eating or drinking.  · You are not pooping like normal.  Get help right away if:  · Your pain gets worse.  · Your problems do not get better.  · Your problems get worse very fast.  · You have a fever.  · You throw up (vomit) more than one time.  · You have poop that is:  ? Bloody.  ? Black.  ? Tarry.  Summary  · Diverticulitis is when small pockets in your large intestine (colon) get infected or swollen.  · Take medicines only as told by your doctor.  · Follow a diet as told by your doctor.  This information is not intended to replace advice given to you by your health care provider. Make sure you discuss  any questions you have with your health care provider.  Document Released: 06/05/2009 Document Revised: 01/04/2018 Document Reviewed: 01/04/2018  [employer] Interactive Patient Education © 2017 [employer]  BMI for Adults  Body mass index (BMI) is a number that is calculated from a person's weight and height. In most adults, the number is used to find how much of an adult's weight is made up of fat. BMI is not as accurate as a direct measure of body fat.  How is BMI calculated?  BMI is calculated by dividing weight in kilograms by height in meters squared. It can also be calculated by dividing weight in pounds by height in inches squared, then multiplying the resulting number by 703. Charts are available to help you find your BMI quickly and easily without doing this calculation.  How is BMI interpreted?  Health care professionals use BMI charts to identify whether an adult is underweight, at a normal weight, or overweight based on the following guidelines:  · Underweight: BMI less than 18.5.  · Normal weight: BMI between 18.5 and 24.9.  · Overweight: BMI between 25 and 29.9.  · Obese: BMI of 30 and above.    BMI is usually interpreted the same for males and females.  Weight includes both fat and muscle, so someone with a muscular build, such as an athlete, may have a BMI that is higher than 24.9. In cases like these, BMI may not accurately depict body fat. To determine if excess body fat is the cause of a BMI of 25 or higher, further assessments may need to be done by a health care provider.  Why is BMI a useful tool?  BMI is used to identify a possible weight problem that may be related to a medical problem or may increase the risk for medical problems. BMI can also be used to promote changes to reach a healthy weight.  This information is not intended to replace advice given to you by your health care provider. Make sure you discuss any questions you have with your health care provider.  Document Released:  08/29/2005 Document Revised: 04/27/2017 Document Reviewed: 05/15/2015  [employer] Interactive Patient Education © 2018 [employer]

## 2018-12-05 NOTE — PROGRESS NOTES
[facility]      Chief Complaint:   Chief Complaint   Patient presents with   • Diverticulitis       Subjective     HPI:   Abdominal pain.  History of diverticulitis.  FH of colon cancer.  History of diverticulitis. March 2017 and November 2018.   She states that last month November 2018 she had LLQ abdominal pain which radiated to the back, she was treated for diverticulitis.   Patient says she was unable come for her scheduled colonoscopy because she was in the hospital with ovarian abscess.  She presents now with complaint of abdominal pain in LLQ, which started 2 days ago.  She denies fever, nausea, vomiting. Had some chills.  She denies constipation or diarrhea. Denies family history of Crohn's disease or ulcerative colitis.  Grandmother had colon cancer in her 40's.      PLAN:  CT scan of abdomen and pelvis.  Ciprofloxacin.  Metronidazole.  Colonoscopy after the diverticulitis resolves, to exclude other etiologies including colitis or malignancy.    Past History:   Past Medical History:   Diagnosis Date   • Acne    • Acquired hypothyroidism    • Acute pharyngitis    • Amenorrhea    • Carbuncle of groin    • Excessive and frequent menstruation with irregular cycle    • FH: blood disorder    • GERD (gastroesophageal reflux disease)    • Hidradenitis suppurativa    • Hirsutism    • Hypothyroidism    • Irregular periods    • Metabolic syndrome X    • Obesity    • Thyroid disease    • Unspecified vitamin D deficiency      Past Surgical History:   Procedure Laterality Date   • INJECTION OF MEDICATION  02/11/2016    Toradol (1)     • OVARIAN CYST SURGERY Left 05/2017   • WISDOM TOOTH EXTRACTION         Family History:  Family History   Problem Relation Age of Onset   • Heart disease Other         Grandmother   • Stroke Other         Grandmother   • Hypertension Other         Mother and Father   • Diabetes Other         Mother and Grandmother   • Liver cancer Other         Grandfather   •  Hypertension Father    • Diabetes Mother    • Hypertension Mother    • Hyperlipidemia Mother    • No Known Problems Sister    • Diabetes Maternal Grandmother    • Heart disease Maternal Grandmother    • Stroke Maternal Grandmother    • Colon cancer Maternal Grandmother    • Liver cancer Maternal Grandfather        Social History:   reports that  has never smoked. she has never used smokeless tobacco. She reports that she drinks alcohol. She reports that she does not use drugs.    Medications:   Prior to Admission medications    Medication Sig Start Date End Date Taking? Authorizing Provider   levothyroxine (SYNTHROID) 100 MCG tablet Take 1 tablet by mouth Daily. 11/26/18  Yes Adela Rodriguez APRN   omeprazole (priLOSEC) 40 MG capsule Take 1 capsule by mouth Daily. 11/16/18  Yes Bella Flanagan APRN   dicyclomine (BENTYL) 20 MG tablet Take 1 tablet by mouth Every 6 (Six) Hours As Needed (Abdominal Cramping). 11/16/18 12/5/18  Bella Flanagan APRN   metFORMIN (GLUCOPHAGE) 500 MG tablet Take 1 tablet by mouth Daily Before Supper. 11/26/18 12/5/18  Adela Rodriguez APRN       Allergies:  Aspirin    ROS:    Review of Systems   Constitutional: Negative for chills, diaphoresis and fever.   HENT: Negative for ear discharge, ear pain and tinnitus.    Eyes: Negative for pain and discharge.   Respiratory: Negative for apnea and choking.    Cardiovascular: Negative for chest pain, palpitations and leg swelling.   Gastrointestinal: Positive for abdominal pain. Negative for abdominal distention, anal bleeding, blood in stool, constipation, diarrhea, nausea, rectal pain and vomiting.   Endocrine: Negative for heat intolerance, polydipsia and polyuria.   Genitourinary: Negative for dysuria and hematuria.   Musculoskeletal: Negative for joint swelling and neck stiffness.   Skin: Negative for color change and pallor.   Neurological: Negative for seizures and headaches.   Hematological: Negative for adenopathy. Does not  "bruise/bleed easily.   Psychiatric/Behavioral: Negative for hallucinations and suicidal ideas.     Objective     Blood pressure 130/92, pulse 97, height 170.2 cm (67\"), weight (!) 158 kg (347 lb 9.6 oz), not currently breastfeeding.    Physical Exam   Constitutional: She is oriented to person, place, and time. She appears well-developed.   HENT:   Head: Normocephalic.   Right Ear: External ear normal.   Left Ear: External ear normal.   Eyes: Conjunctivae are normal. Right eye exhibits no discharge. Left eye exhibits no discharge. No scleral icterus.   Neck: Neck supple. No thyromegaly present.   Cardiovascular: Normal rate and regular rhythm.   Pulmonary/Chest: Effort normal. She has no wheezes.   Abdominal: Soft. She exhibits no distension and no mass. There is tenderness. There is no rebound and no guarding.   Tender suprapubic and LLQ   Musculoskeletal: She exhibits no edema or deformity.   Neurological: She is alert and oriented to person, place, and time.   Skin: Skin is warm.   Psychiatric: Her behavior is normal.        Laboratory Data:   Lab Results - Last 18 Months   Lab Units  11/07/18   1538  03/08/18   0839   GLUCOSE mg/dL  101*  110*   BUN mg/dL  13  15   CREATININE mg/dL  0.96  0.70   SODIUM mmol/L  144  139   POTASSIUM mmol/L  4.5  4.1   CHLORIDE mmol/L  108*  106   CO2 mmol/L  28.0  26.0   CALCIUM mg/dL  9.2  8.7   TOTAL PROTEIN g/dL  8.3*  7.3   ALBUMIN g/dL  4.30  3.80   ALT (SGPT) U/L  64*  32   AST (SGOT) U/L  47*  26   ALK PHOS U/L  67  53   BILIRUBIN mg/dL  0.5  0.6   EGFR IF NONAFRICN AM mL/min/1.73  71  103   GLOBULIN gm/dL  4.0*  3.5   A/G RATIO g/dL  1.1  1.1   BUN / CREAT RATIO   13.5  21.4   ANION GAP mmol/L  8.0  7.0     Lab Results - Last 18 Months   Lab Units  11/07/18   1538   WBC 10*3/mm3  13.01*   RBC 10*6/mm3  4.72   HEMOGLOBIN g/dL  14.6   HEMATOCRIT %  44.4   MCV fL  94.1   MCH pg  30.9   MCHC g/dL  32.9   RDW %  13.3   RDW-SD fl  44.5   MPV fL  11.3   PLATELETS 10*3/mm3  261 "     No results for input(s): INR in the last 62010 hours.  No results for input(s): CRP in the last 09095 hours.  Lab Results - Last 18 Months   Lab Units  11/07/18   1538   LIPASE U/L  94     Lab Results - Last 18 Months   Lab Units  11/07/18   1538   AMYLASE U/L  76       Ct Abdomen Pelvis With Contrast    Result Date: 12/14/2018  CONCLUSION: Inflammatory process in the pelvis, left adnexa. Inflammatory process also involves the sigmoid colon. This left adnexal inflammatory process however appears smaller and well defined in comparison to prior exam. A likely diagnostic consideration, therefore is pelvic inflammatory disease versus diverticulitis, (less likely). A tubular fluid-filled enhancing structure courses from this adnexal inflammatory processes posteriorly towards the rectum and vagina. This tubular inflammatory fluid filled structure may be a developing fistula but it is smaller than prior examination therefore demonstrating a favorable change. Electronically signed by:  Jose Lyman MD  12/14/2018 3:30 PM CST Workstation: [id number]      Assessment/Plan   Elysia was seen today for diverticulitis.    Diagnoses and all orders for this visit:    Diverticulitis  -     metroNIDAZOLE (FLAGYL) 500 MG tablet; Take 1 tablet by mouth 3 (Three) Times a Day for 14 days.  -     ciprofloxacin (CIPRO) tablet 500 mg; Take 1 tablet by mouth Every 12 (Twelve) Hours.  -     CT Abdomen Pelvis With Contrast    Left lower quadrant pain  -     CT Abdomen Pelvis With Contrast    Family history of colon cancer        * Surgery not found *     Diagnosis Plan   1. Diverticulitis  metroNIDAZOLE (FLAGYL) 500 MG tablet    ciprofloxacin (CIPRO) tablet 500 mg    CT Abdomen Pelvis With Contrast   2. Left lower quadrant pain  CT Abdomen Pelvis With Contrast   3. Family history of colon cancer         Anticipated Surgical Procedure:  Orders Placed This Encounter   Procedures   • CT Abdomen Pelvis With Contrast     Order Specific Question:    Patient Pregnant     Answer:   No     Order Specific Question:   Will Oral Contrast be needed for this procedure?     Answer:   Yes         I discussed the assessment and recommendations with the patient. she verbalized understanding. All questions were answered. The patient denies any further question.    Aderemi Jaiyeola, MD  12/20/18  11:12 AM    EMR Dragon/Transcription disclaimer:   Some of this note may be an electronic transcription/translation of spoken language to printed text. The electronic translation of spoken language may permit erroneous, or at times, nonsensical words or phrases to be inadvertently transcribed; Although I have reviewed the note for such errors, some may still exist.

## 2018-12-05 NOTE — PROGRESS NOTES
"Subjective   Elysia Hernandez is a 23 y.o. female who presents to the office for f/u on abdominal pain.    History of Present Illness     Office visit 11/7/18: \"Patient comes in today with chief complaint of lower abdominal pain and low back pain since Sunday.  She states pain is getting worse.  Accompanied by diaphoresis.  Constantly, one episode of vomiting on Monday, and nausea off and on.  States pain is very low abdomen, almost pelvic region, constant achy pain, crampy-like with random sharp pains.  Tuesday she had constipation, but today she had several bowel movements, however, none of them or diarrhea.  She describes low back pain as achy type pain radiating across her lower back.  States history of blood in stool at times with severe constipation.  Patient states pain is not affected by food and is currently keeping food and fluids down.  Patient states that for the past year or so  She has had abnormal bowel movements where she fluctuates between constipation and diarrhea and crampy-like abdominal pain.  States the intensity and frequency of this has worsened.  Patient states surgical history of abscess removed off her left ovary in May 2017 by Dr. Florez, history seen [facility] and states that she said that she has not PCO S.  She states that she was told her tubes or so scarred that she might have trouble getting pregnant, however, she is not trying to not get pregnant.  Patient also complains of pain with intercourse at times and bleeding after intercourse at times.  History of pelvic inflammatory disease last year.  Patient states her periods normally come at the same time every month but occasionally she misses one of them.  They last a week or longer.  They are painful but not very heavy.\"  -POC for 11/7/18:  Ordered stat CBC, CMP, amylase, UA, x-ray of abdomen and KUB.  Labs and x-ray did not show anything significant except for mildly elevated white blood cell count, " monocytes, and neutrophils.  Will call patient about lipase and sedimentation rate levels when the result.  Discussed with patient.  Plan of care: Alternate Carafate and Bentyl for symptoms, added omeprazole as acid reducer.  Medrol Dosepak for possible inflammation.  Pt would like to avoid antibiotic for now, patient does have a history of diverticulitis, so I suggested we do CT scan of abdomen and pelvis with contrast tomorrow.  Will follow-up after results.  -ct scan on 11/8/18 showed diverticulilits of lower sigmoid colon, pt treated and told to f/u via phone  -pt saw [facility]win [facility] for pelvic pain and bleeding, will f/u if not improving with abx for pelvic u/s.   -today 11/16/18, pt staes much better, without pain or symptoms.     Family history:  -Mom-diabetes  -Mom's mom-cancer, unsure of what type    Surgical history:  -Abscess removed off left ovary May 2017    Past medical history:  -Hypothyroidism-chronic, controlled on Synthroid 100 MCG daily.  -Pelvic inflammatory disease 2017    The following portions of the patient's history were reviewed and updated as appropriate: allergies, current medications, past family history, past medical history, past social history, past surgical history and problem list.    Review of Systems   Constitutional: Negative for activity change, appetite change, chills, diaphoresis, fatigue, fever and unexpected weight change.   HENT: Negative for congestion, ear discharge, ear pain, nosebleeds, postnasal drip, rhinorrhea, sinus pressure, sinus pain, sneezing, sore throat, tinnitus and trouble swallowing.    Eyes: Negative.    Respiratory: Negative for cough, chest tightness, shortness of breath and wheezing.    Cardiovascular: Negative for chest pain, palpitations and leg swelling.   Gastrointestinal: Negative for abdominal distention, abdominal pain, blood in stool, constipation, diarrhea, nausea and vomiting.   Genitourinary: Negative for difficulty urinating,  "dyspareunia, dysuria, flank pain, frequency, hematuria, menstrual problem, vaginal bleeding, vaginal discharge and vaginal pain.   Musculoskeletal: Negative for arthralgias, back pain, gait problem, joint swelling and myalgias.   Skin: Negative for rash and wound.   Allergic/Immunologic: Negative for environmental allergies, food allergies and immunocompromised state.   Neurological: Negative for dizziness, tremors, seizures, syncope, weakness, light-headedness, numbness and headaches.   Hematological: Does not bruise/bleed easily.   Psychiatric/Behavioral: Negative for behavioral problems, self-injury, sleep disturbance and suicidal ideas. The patient is not nervous/anxious.        Past Medical History:   Diagnosis Date   • Acne    • Acquired hypothyroidism    • Acute pharyngitis    • Amenorrhea    • Carbuncle of groin    • Excessive and frequent menstruation with irregular cycle    • FH: blood disorder    • Hidradenitis suppurativa    • Hirsutism    • Hypothyroidism    • Irregular periods    • Metabolic syndrome X    • Obesity    • Unspecified vitamin D deficiency        Family History   Problem Relation Age of Onset   • No Known Problems Other    • Hypertension Father    • Diabetes Mother    • Hypertension Mother    • Hyperlipidemia Mother    • No Known Problems Sister           Objective   /60   Pulse 83   Temp 98.5 °F (36.9 °C) (Temporal)   Resp 16   Ht 170.2 cm (67\")   Wt (!) 158 kg (349 lb)   SpO2 98%   Breastfeeding? No   BMI 54.66 kg/m²   Physical Exam   Constitutional: She is oriented to person, place, and time. She appears well-developed and well-nourished. She is cooperative. No distress.   Cardiovascular: Normal rate, regular rhythm, normal heart sounds and intact distal pulses. Exam reveals no gallop and no friction rub.   No murmur heard.  Pulmonary/Chest: Effort normal and breath sounds normal. No respiratory distress. She has no wheezes. She has no rales.   Abdominal: Soft. Normal " appearance and bowel sounds are normal. She exhibits no shifting dullness, no distension, no pulsatile liver, no fluid wave, no abdominal bruit, no ascites, no pulsatile midline mass and no mass. There is no hepatosplenomegaly. There is no tenderness. There is no rigidity, no rebound, no guarding, no CVA tenderness, no tenderness at McBurney's point and negative Jain's sign. No hernia.   Neurological: She is alert and oriented to person, place, and time. She is not disoriented. Coordination and gait normal.   Skin: Skin is warm and dry.   Psychiatric: She has a normal mood and affect. Her speech is normal and behavior is normal. She is not actively hallucinating. She is attentive.   Nursing note and vitals reviewed.       PHQ-2/PHQ-9 Depression Screening 11/16/2018   Little interest or pleasure in doing things 0   Feeling down, depressed, or hopeless 0   Total Score 0         Assessment/Plan   Elysia was seen today for follow-up.    Diagnoses and all orders for this visit:    Diverticulitis    Hyperinsulinemia  -     Hemoglobin A1c  -     Insulin, Free & Total, Serum    Hypothyroidism, unspecified type  -     T4, Free  -     TSH    Screening for deficiency anemia    Other orders  -     omeprazole (priLOSEC) 40 MG capsule; Take 1 capsule by mouth Daily.  -     dicyclomine (BENTYL) 20 MG tablet; Take 1 tablet by mouth Every 6 (Six) Hours As Needed (Abdominal Cramping).           Patient seen on 11/7/18 for complaining of lower abdominal pain/pelvic pain and low back pain bilaterally without sciatica, accompanied by nausea and vomiting For several days.  Lab work up negative. CT scan showed low sigmoid colon diverticulitis on 11/8/18, pt was treated with abx. Pt states doing better. Pt will continue omeprazole daily. Bentyl prescrbied prn. F/u if needed, if reoccurrence susgested consult with GI since pt did not want that referral today.    Fasting labs ordered, f/u in six mtns if labs okay.     Patient educated to  follow-up sooner than next scheduled appointment if condition(s) worse or do not improve. Patient states understanding and is in agreeance with plan of care. An After Visit Summary was printed and given to the patient.      BHARAT Rubio        This document has been electronically signed by BHARAT Rubio on December 4, 2018 9:59 PM      EMR/Transcription Dragon Disclaimer:  Some of this note may be an electronic dragon transcription/translation of spoken language to printed text. The electronic translation of spoken language may permit erroneous, or at times, nonsensical words or phrases to be inadvertently transcribed. Although I have reviewed the note for such errors, some may still exist.

## 2018-12-18 ENCOUNTER — EPISODE CHANGES (OUTPATIENT)
Dept: CASE MANAGEMENT | Facility: OTHER | Age: 23
End: 2018-12-18

## 2018-12-20 ENCOUNTER — OFFICE VISIT (OUTPATIENT)
Dept: GASTROENTEROLOGY | Facility: CLINIC | Age: 23
End: 2018-12-20

## 2018-12-20 ENCOUNTER — CLINICAL SUPPORT (OUTPATIENT)
Dept: OBSTETRICS AND GYNECOLOGY | Facility: CLINIC | Age: 23
End: 2018-12-20

## 2018-12-20 VITALS
DIASTOLIC BLOOD PRESSURE: 82 MMHG | HEART RATE: 98 BPM | OXYGEN SATURATION: 98 % | HEIGHT: 67 IN | BODY MASS INDEX: 45.99 KG/M2 | SYSTOLIC BLOOD PRESSURE: 142 MMHG | WEIGHT: 293 LBS

## 2018-12-20 DIAGNOSIS — R10.84 GENERALIZED ABDOMINAL PAIN: ICD-10-CM

## 2018-12-20 DIAGNOSIS — N73.0 PID (ACUTE PELVIC INFLAMMATORY DISEASE): Primary | ICD-10-CM

## 2018-12-20 DIAGNOSIS — R93.5 ABNORMAL CT SCAN, PELVIS: Primary | ICD-10-CM

## 2018-12-20 DIAGNOSIS — N73.9 PELVIC INFLAMMATORY DISEASE (PID): ICD-10-CM

## 2018-12-20 PROCEDURE — 99214 OFFICE O/P EST MOD 30 MIN: CPT | Performed by: NURSE PRACTITIONER

## 2018-12-20 PROCEDURE — 96372 THER/PROPH/DIAG INJ SC/IM: CPT | Performed by: NURSE PRACTITIONER

## 2018-12-20 RX ORDER — CEFTRIAXONE SODIUM 250 MG/1
250 INJECTION, POWDER, FOR SOLUTION INTRAMUSCULAR; INTRAVENOUS ONCE
Status: DISCONTINUED | OUTPATIENT
Start: 2018-12-20 | End: 2019-01-22

## 2018-12-20 RX ORDER — CEFTRIAXONE SODIUM 250 MG/1
250 INJECTION, POWDER, FOR SOLUTION INTRAMUSCULAR; INTRAVENOUS ONCE
Status: COMPLETED | OUTPATIENT
Start: 2018-12-20 | End: 2018-12-20

## 2018-12-20 RX ORDER — DOXYCYCLINE HYCLATE 100 MG/1
100 TABLET, DELAYED RELEASE ORAL 2 TIMES DAILY
Qty: 28 TABLET | Refills: 0 | Status: SHIPPED | OUTPATIENT
Start: 2018-12-20 | End: 2019-01-21

## 2018-12-20 RX ADMIN — CEFTRIAXONE SODIUM 250 MG: 250 INJECTION, POWDER, FOR SOLUTION INTRAMUSCULAR; INTRAVENOUS at 15:28

## 2018-12-20 NOTE — PATIENT INSTRUCTIONS
Magnetic Resonance Imaging  Magnetic resonance imaging (MRI) is a painless test that takes pictures of the inside of your body. This test uses a strong magnet. This test does not use X-rays or radiation.  What happens before the procedure?  · You will be asked to take off all metal. This includes:  ? Your watch, jewelry, and other metal items.  ? Some makeup may have very small bits of metal and may need to be taken off.  ? Braces and fillings normally are not a problem.  What happens during the procedure?  · You may be given earplugs or headphones to listen to music. The machine can be noisy.  · You might get a shot (injection) with a dye (contrast material) to help the MRI take better pictures.  · MRI is done in a tunnel-shaped scanner. You will lie on a table that slides into the tunnel-shaped scanner. Once inside, you will still be able to talk to the person doing the test.  · You will be asked to hold very still. You will be told when you can shift position. You may have to wait a few minutes to make sure the images are readable.  What happens after the procedure?  · You may go back to your normal activities right away.  · If you got a shot of dye, it will pass naturally through your body within a day.  · Your doctor will talk to you about the results.  This information is not intended to replace advice given to you by your health care provider. Make sure you discuss any questions you have with your health care provider.  Document Released: 01/20/2012 Document Revised: 05/25/2017 Document Reviewed: 02/12/2015  [employer] Interactive Patient Education © 2018 [employer]

## 2018-12-20 NOTE — PROGRESS NOTES
Chief Complaint   Patient presents with   • Diverticulitis       Subjective    Elysia Hernandez is a 23 y.o. female. she is here today for follow-up.    History of Present Illness  23-year-old female presents for follow-up after CT due to diverticulitis.  She had diverticulitis March 2017 and November 2018.  She took Cipro and Flagyl without significant improvement of symptoms.  She had implanted IUD, Yumiko IUD for 6 weeks and then developed ovarian abscess was hospitalized at [facility].  She denies any fever, nausea, vomiting reports generalized abdominal pain.  Denies any recent change in her bowel habits reports they're very irregular constipation to diarrhea.  She took full course of Flagyl and has not had any significant change in symptoms.  CT results listed below.  IMPRESSION:  CONCLUSION: Inflammatory process in the pelvis, left adnexa.  Inflammatory process also involves the sigmoid colon. This left  adnexal inflammatory process however appears smaller and well  defined in comparison to prior exam. A likely diagnostic  consideration, therefore is pelvic inflammatory disease versus  diverticulitis, (less likely). A tubular fluid-filled enhancing  structure courses from this adnexal inflammatory processes  posteriorly towards the rectum and vagina. This tubular  inflammatory fluid filled structure may be a developing fistula  but it is smaller than prior examination therefore demonstrating  a favorable change.      Electronically signed by:  Jose Lyman MD  12/14/2018 3:30 PM CST  Workstation: [id number]   Plan; we'll order MRI pelvis to rule out fistulous tract.  We will treat PID with Rocephin and doxycycline.  Case discussed with BHARAT Olivares  Patient will follow-up GYN.     The following portions of the patient's history were reviewed and updated as appropriate:   Past Medical History:   Diagnosis Date   • Acne    • Acquired hypothyroidism    • Acute pharyngitis    • Amenorrhea    • Carbuncle  of groin    • Excessive and frequent menstruation with irregular cycle    • FH: blood disorder    • GERD (gastroesophageal reflux disease)    • Hidradenitis suppurativa    • Hirsutism    • Hypothyroidism    • Irregular periods    • Metabolic syndrome X    • Obesity    • Thyroid disease    • Unspecified vitamin D deficiency      Past Surgical History:   Procedure Laterality Date   • INJECTION OF MEDICATION  2016    Toradol (1)     • OVARIAN CYST SURGERY Left 2017   • WISDOM TOOTH EXTRACTION       Family History   Problem Relation Age of Onset   • Heart disease Other         Grandmother   • Stroke Other         Grandmother   • Hypertension Other         Mother and Father   • Diabetes Other         Mother and Grandmother   • Liver cancer Other         Grandfather   • Hypertension Father    • Diabetes Mother    • Hypertension Mother    • Hyperlipidemia Mother    • No Known Problems Sister    • Diabetes Maternal Grandmother    • Heart disease Maternal Grandmother    • Stroke Maternal Grandmother    • Colon cancer Maternal Grandmother    • Liver cancer Maternal Grandfather      OB History      Para Term  AB Living    0 0 0 0 0 0    SAB TAB Ectopic Molar Multiple Live Births    0 0 0   0          Current Outpatient Medications   Medication Sig Dispense Refill   • levothyroxine (SYNTHROID) 100 MCG tablet Take 1 tablet by mouth Daily. 90 tablet 3   • doxycycline (DORYX) 100 MG enteric coated tablet Take 1 tablet by mouth 2 (Two) Times a Day. 28 tablet 0   • omeprazole (priLOSEC) 40 MG capsule Take 1 capsule by mouth Daily. 30 capsule 5     Current Facility-Administered Medications   Medication Dose Route Frequency Provider Last Rate Last Dose   • cefTRIAXone (ROCEPHIN) injection 250 mg  250 mg Intramuscular Once Lety Sosa APRN         Allergies   Allergen Reactions   • Aspirin Other (See Comments)     Eye swelling       Social History     Socioeconomic History   • Marital status:      Spouse  "name: Not on file   • Number of children: Not on file   • Years of education: Not on file   • Highest education level: Not on file   Occupational History   • Occupation: Beautician     Comment: Patient resides with    Tobacco Use   • Smoking status: Never Smoker   • Smokeless tobacco: Never Used   Substance and Sexual Activity   • Alcohol use: Yes     Comment: 12/05/2018 - Patient reports consumption of 1 alcoholic beverage per month.   • Drug use: No   • Sexual activity: Yes     Partners: Male     Birth control/protection: None       Review of Systems  Review of Systems   Constitutional: Positive for fatigue. Negative for activity change, appetite change, chills, diaphoresis, fever and unexpected weight change.   HENT: Negative for sore throat and trouble swallowing.    Respiratory: Negative for shortness of breath.    Gastrointestinal: Positive for abdominal pain, constipation (bowel habits always vary ) and diarrhea. Negative for abdominal distention, anal bleeding, blood in stool, nausea, rectal pain and vomiting.   Genitourinary: Positive for pelvic pain.   Musculoskeletal: Negative for arthralgias.   Skin: Negative for pallor.   Neurological: Negative for light-headedness.        /82 (BP Location: Left arm)   Pulse 98   Ht 170.2 cm (67\")   Wt (!) 160 kg (352 lb 12.8 oz)   SpO2 98%   BMI 55.26 kg/m²     Objective    Physical Exam   Constitutional: She is oriented to person, place, and time. She appears well-developed and well-nourished. She is cooperative. No distress.   HENT:   Head: Normocephalic and atraumatic.   Neck: Normal range of motion. Neck supple. No thyromegaly present.   Cardiovascular: Normal rate, regular rhythm and normal heart sounds.   Pulmonary/Chest: Effort normal and breath sounds normal. She has no wheezes. She has no rhonchi. She has no rales.   Abdominal: Soft. Normal appearance and bowel sounds are normal. She exhibits no distension. There is no hepatosplenomegaly. " There is generalized tenderness. There is no rigidity and no guarding. No hernia.   Lymphadenopathy:     She has no cervical adenopathy.   Neurological: She is alert and oriented to person, place, and time.   Skin: Skin is warm, dry and intact. No rash noted. No pallor.   Psychiatric: She has a normal mood and affect. Her speech is normal.     Office Visit on 11/16/2018   Component Date Value Ref Range Status   • Free T4 11/16/2018 1.21  0.78 - 2.19 ng/dL Final   • TSH 11/16/2018 3.040  0.460 - 4.680 mIU/mL Final   • Hemoglobin A1C 11/16/2018 5.7* 4 - 5.6 % Final   • Insulin, Free 11/16/2018 46* uU/mL Final    Reference Range:  Pubertal Children and  Adults (fasting): 0 - 17   • Insulin 11/16/2018 46  uU/mL Final    Non-Diabetic:  In the absence of insulin-binding antibodies,  the free and total insulin assays are equivalent. However,  this assay is intended for use in diabetics with insulin  autoantibody present. Measurement is performed on  acid-treated samples and, therefore, the sensitivity and  absolute values by this method may differ from our direct  insulin ICMA.  Insulin Dependent Diabetic Patients:  Free Insulin levels  vary depending on the capacity and affinity of circulating  insulin-binding antibodies and the dose of insulin given to  the patient.  Total insulin levels represent free insulin  and antibody bound insulin fractions.     Assessment/Plan      1. Abnormal CT scan, pelvis    2. Generalized abdominal pain    3. Pelvic inflammatory disease (PID)    .       Orders placed during this encounter include:  Orders Placed This Encounter   Procedures   • MRI pelvis w contrast     Standing Status:   Future     Standing Expiration Date:   12/20/2019     Scheduling Instructions:      Concern for fistula please use rectal contrast     Order Specific Question:   Patient Pregnant     Answer:   No   • Ambulatory Referral to Obstetrics / Gynecology     Referral Priority:   Routine     Referral Type:    Consultation     Referral Reason:   Specialty Services Required     Requested Specialty:   Obstetrics and Gynecology     Number of Visits Requested:   1       * Surgery not found *    Review and/or summary of lab tests, radiology, procedures, medications. Review and summary of old records and obtaining of history. The risks and benefits of my recommendations, as well as other treatment options were discussed with the patient today. Questions were answered.    New Medications Ordered This Visit   Medications   • doxycycline (DORYX) 100 MG enteric coated tablet     Sig: Take 1 tablet by mouth 2 (Two) Times a Day.     Dispense:  28 tablet     Refill:  0   • cefTRIAXone (ROCEPHIN) injection 250 mg       Follow-up: Return in about 4 weeks (around 1/17/2019).          This document has been electronically signed by BHARAT Fried on December 20, 2018 3:00 PM             Results for orders placed or performed in visit on 11/16/18   Insulin, Free & Total, Serum   Result Value Ref Range    Insulin, Free 46 (H) uU/mL    Insulin 46 uU/mL   TSH   Result Value Ref Range    TSH 3.040 0.460 - 4.680 mIU/mL   T4, Free   Result Value Ref Range    Free T4 1.21 0.78 - 2.19 ng/dL   Hemoglobin A1c   Result Value Ref Range    Hemoglobin A1C 5.7 (H) 4 - 5.6 %   Results for orders placed or performed in visit on 11/07/18   Urinalysis With Culture If Indicated - Urine, Clean Catch   Result Value Ref Range    Color, UA Yellow Yellow, Straw    Appearance, UA Clear Clear    pH, UA 5.5 5.5 - 8.0    Specific Gravity, UA 1.020 1.005 - 1.030    Glucose, UA Negative Negative    Ketones, UA Negative Negative    Bilirubin, UA Negative Negative    Blood, UA Negative Negative    Protein, UA Negative Negative    Leuk Esterase, UA Negative Negative    Nitrite, UA Negative Negative    Urobilinogen, UA 0.2 E.U./dL 0.2 - 1.0 E.U./dL   CBC Auto Differential   Result Value Ref Range    WBC 13.01 (H) 3.20 - 9.80 10*3/mm3    RBC 4.72 3.77 - 5.16 10*6/mm3     Hemoglobin 14.6 12.0 - 15.5 g/dL    Hematocrit 44.4 35.0 - 45.0 %    MCV 94.1 80.0 - 98.0 fL    MCH 30.9 26.5 - 34.0 pg    MCHC 32.9 31.4 - 36.0 g/dL    RDW 13.3 11.5 - 14.5 %    RDW-SD 44.5 36.4 - 46.3 fl    MPV 11.3 8.0 - 12.0 fL    Platelets 261 150 - 450 10*3/mm3    Neutrophil % 72.1 37.0 - 80.0 %    Lymphocyte % 18.5 10.0 - 50.0 %    Monocyte % 7.8 0.0 - 12.0 %    Eosinophil % 1.4 0.0 - 7.0 %    Basophil % 0.2 0.0 - 2.0 %    Neutrophils, Absolute 9.39 (H) 2.00 - 8.60 10*3/mm3    Lymphocytes, Absolute 2.41 0.60 - 4.20 10*3/mm3    Monocytes, Absolute 1.01 (H) 0.00 - 0.90 10*3/mm3    Eosinophils, Absolute 0.18 0.00 - 0.70 10*3/mm3    Basophils, Absolute 0.02 0.00 - 0.20 10*3/mm3   Pregnancy, Urine - Urine, Clean Catch   Result Value Ref Range    HCG, Urine QL Negative Negative   Sedimentation Rate   Result Value Ref Range    Sed Rate 20 0 - 20 mm/hr   Lipase   Result Value Ref Range    Lipase 94 23 - 300 U/L   Amylase   Result Value Ref Range    Amylase 76 30 - 110 U/L   Comprehensive Metabolic Panel   Result Value Ref Range    Glucose 101 (H) 74 - 99 mg/dL    BUN 13 7 - 17 mg/dL    Creatinine 0.96 0.52 - 1.04 mg/dL    Sodium 144 137 - 145 mmol/L    Potassium 4.5 3.4 - 5.0 mmol/L    Chloride 108 (H) 98 - 107 mmol/L    CO2 28.0 22.0 - 30.0 mmol/L    Calcium 9.2 8.4 - 10.2 mg/dL    Total Protein 8.3 (H) 6.3 - 8.2 g/dL    Albumin 4.30 3.50 - 5.00 g/dL    ALT (SGPT) 64 (H) <=35 U/L    AST (SGOT) 47 (H) 14 - 36 U/L    Alkaline Phosphatase 67 38 - 126 U/L    Total Bilirubin 0.5 0.2 - 1.3 mg/dL    eGFR Non  Amer 71 71 - 165 mL/min/1.73    Globulin 4.0 (H) 2.3 - 3.5 gm/dL    A/G Ratio 1.1 1.1 - 1.8 g/dL    BUN/Creatinine Ratio 13.5 7.0 - 25.0    Anion Gap 8.0 5.0 - 15.0 mmol/L   Results for orders placed or performed in visit on 03/08/18   Vitamin D 25 hydroxy   Result Value Ref Range    25 Hydroxy, Vitamin D 18.1 (L) 30.0 - 100.0 ng/ml   Insulin, Total   Result Value Ref Range    Insulin 54.9 (H) 2.6 - 24.9 uIU/mL    TSH   Result Value Ref Range    TSH 8.350 (H) 0.460 - 4.680 mIU/mL   Hemoglobin A1c   Result Value Ref Range    Hemoglobin A1C 5.5 4 - 5.6 %   Lipid panel   Result Value Ref Range    Total Cholesterol 128 (L) 150 - 200 mg/dL    Triglycerides 57 35 - 160 mg/dL    HDL Cholesterol 53 35 - 100 mg/dL    LDL Cholesterol  64 mg/dL    VLDL Cholesterol 11.4 mg/dL    LDL/HDL Ratio 1.20    Comprehensive metabolic panel   Result Value Ref Range    Glucose 110 (H) 70 - 100 mg/dL    BUN 15 8 - 25 mg/dL    Creatinine 0.70 0.40 - 1.30 mg/dL    Sodium 139 134 - 146 mmol/L    Potassium 4.1 3.4 - 5.4 mmol/L    Chloride 106 100 - 112 mmol/L    CO2 26.0 20.0 - 32.0 mmol/L    Calcium 8.7 8.4 - 10.8 mg/dL    Total Protein 7.3 6.7 - 8.2 g/dL    Albumin 3.80 3.20 - 5.50 g/dL    ALT (SGPT) 32 10 - 60 U/L    AST (SGOT) 26 10 - 60 U/L    Alkaline Phosphatase 53 15 - 121 U/L    Total Bilirubin 0.6 0.2 - 1.0 mg/dL    eGFR Non  Amer 103 71 - 165 mL/min/1.73    Globulin 3.5 2.5 - 4.6 gm/dL    A/G Ratio 1.1 1.0 - 3.0 g/dL    BUN/Creatinine Ratio 21.4 7.0 - 25.0    Anion Gap 7.0 5.0 - 15.0 mmol/L   Results for orders placed or performed in visit on 07/26/17   Urinalysis w/Culture if Indicated   Result Value Ref Range    Color, UA Yellow Yellow, Straw    Appearance, UA Cloudy (A) Clear    pH, UA 5.5 5.5 - 8.0    Specific Gravity, UA 1.020 1.005 - 1.030    Glucose, UA Negative Negative    Ketones, UA Negative Negative    Bilirubin, UA Negative Negative    Blood, UA Negative Negative    Protein, UA Negative Negative    Leuk Esterase, UA Negative Negative    Nitrite, UA Negative Negative    Urobilinogen, UA 0.2 E.U./dL 0.2 - 1.0 E.U./dL     *Note: Due to a large number of results and/or encounters for the requested time period, some results have not been displayed. A complete set of results can be found in Results Review.

## 2018-12-27 ENCOUNTER — HOSPITAL ENCOUNTER (OUTPATIENT)
Dept: MRI IMAGING | Facility: HOSPITAL | Age: 23
End: 2018-12-27

## 2018-12-27 ENCOUNTER — TELEPHONE (OUTPATIENT)
Dept: GASTROENTEROLOGY | Facility: CLINIC | Age: 23
End: 2018-12-27

## 2018-12-27 NOTE — TELEPHONE ENCOUNTER
Contacted [facility] to schedule patient for MRI. Scheduled for Ramón 3 2019 at 11am. Contacted passport to change venue for MRI Passport Authorization number is [id number] good from 12-24-18 to 1-23-19.

## 2018-12-28 ENCOUNTER — TELEPHONE (OUTPATIENT)
Dept: GASTROENTEROLOGY | Facility: CLINIC | Age: 23
End: 2018-12-28

## 2018-12-28 NOTE — TELEPHONE ENCOUNTER
Patient requested MRI be done at [place]. Cancelled procedure in [place] and scheduled procedure in [place] for Ramón 3,2019. Patient voiced understanding.

## 2018-12-31 ENCOUNTER — TELEPHONE (OUTPATIENT)
Dept: GASTROENTEROLOGY | Facility: CLINIC | Age: 23
End: 2018-12-31

## 2018-12-31 NOTE — TELEPHONE ENCOUNTER
Contacted insurance company to get MRI pelvic w and wo contrast approved as one call had gotten the wrong procedure approved. Gave information to passport and procedure was approved. Contacted [facility] with information.

## 2019-01-04 DIAGNOSIS — R10.84 GENERALIZED ABDOMINAL PAIN: ICD-10-CM

## 2019-01-04 DIAGNOSIS — R93.5 ABNORMAL CT SCAN, PELVIS: ICD-10-CM

## 2019-01-08 ENCOUNTER — EPISODE CHANGES (OUTPATIENT)
Dept: CASE MANAGEMENT | Facility: OTHER | Age: 24
End: 2019-01-08

## 2019-01-08 ENCOUNTER — PATIENT OUTREACH (OUTPATIENT)
Dept: CASE MANAGEMENT | Facility: OTHER | Age: 24
End: 2019-01-08

## 2019-01-08 NOTE — OUTREACH NOTE
Care Management Plan 1/8/2019   Lifestyle Goals Eat a healthy diet;Routine follow-up with doctor(s)   Barriers Disease education   Self Management Dietary Changes -  Reduce Caloric Intake;Dietary Changes - Eat More Fruits/Vegetables;Increase Physical Activities   Annual Wellness Visit:  (No Data)   Annual Wellness Visit:  Not a Medicare Patient   Care Gaps Addressed Other (See Comment)   Care Gaps Addressed no care gaps open   Specific Disease Process Teaching Diabetes   Does patient have depression diagnosis? No   Advanced Directives: Not Interested At This Time   Ed Visits past 12 months: None   Hospitalizations past 12 months None   Medication Adherence Medications understood   Health Literacy Good     The main concerns and/or symptoms the patient would like to address are: None at this time.    Education/instruction provided by Care Coordinator: Patient states she is doing really well, reports blood pressure and blood sugar WNL. Patient is pre-diabetic, most recent a1c of 5.7 discussed. Patient's care gaps in [facility] were noted to be eye and nephrology exam, however, per patient and chart she is not actually diabetic. Care gaps for ambulatory care visit closed as patient has been seen by PCP 11/16/18. Patient stated she had no questions or concerns at this time, but has CC contact information if needed and is agreeable for CC to call again in a few weeks to reassess.    Follow Up Outreach Due: 4-6 weeks    Anne Schuster RN

## 2019-01-11 DIAGNOSIS — R93.89 ABNORMAL MRI: Primary | ICD-10-CM

## 2019-01-16 DIAGNOSIS — R93.89 ABNORMAL FINDING ON RADIOLOGY EXAM: Primary | ICD-10-CM

## 2019-01-17 ENCOUNTER — LAB (OUTPATIENT)
Dept: LAB | Facility: HOSPITAL | Age: 24
End: 2019-01-17

## 2019-01-17 ENCOUNTER — HOSPITAL ENCOUNTER (OUTPATIENT)
Dept: CT IMAGING | Facility: HOSPITAL | Age: 24
Discharge: HOME | End: 2019-01-17
Admitting: NURSE PRACTITIONER

## 2019-01-17 DIAGNOSIS — R93.89 ABNORMAL FINDING ON RADIOLOGY EXAM: ICD-10-CM

## 2019-01-17 DIAGNOSIS — R93.89 ABNORMAL MRI: ICD-10-CM

## 2019-01-17 LAB — B-HCG UR QL: NEGATIVE

## 2019-01-17 PROCEDURE — 74177 CT ABD & PELVIS W/CONTRAST: CPT

## 2019-01-17 PROCEDURE — 81025 URINE PREGNANCY TEST: CPT

## 2019-01-17 PROCEDURE — 86255 FLUORESCENT ANTIBODY SCREEN: CPT

## 2019-01-17 PROCEDURE — 83516 IMMUNOASSAY NONANTIBODY: CPT

## 2019-01-17 PROCEDURE — 36415 COLL VENOUS BLD VENIPUNCTURE: CPT

## 2019-01-17 PROCEDURE — 25010000002 IOPAMIDOL 61 % SOLUTION: Performed by: NURSE PRACTITIONER

## 2019-01-17 PROCEDURE — 86671 FUNGUS NES ANTIBODY: CPT

## 2019-01-17 RX ADMIN — IOPAMIDOL 90 ML: 612 INJECTION, SOLUTION INTRAVENOUS at 15:24

## 2019-01-21 ENCOUNTER — OFFICE VISIT (OUTPATIENT)
Dept: OBSTETRICS AND GYNECOLOGY | Facility: CLINIC | Age: 24
End: 2019-01-21

## 2019-01-21 VITALS
HEIGHT: 67 IN | SYSTOLIC BLOOD PRESSURE: 126 MMHG | WEIGHT: 293 LBS | DIASTOLIC BLOOD PRESSURE: 89 MMHG | BODY MASS INDEX: 45.99 KG/M2

## 2019-01-21 DIAGNOSIS — R10.2 ADNEXAL PAIN: ICD-10-CM

## 2019-01-21 DIAGNOSIS — R10.2 CHRONIC PELVIC PAIN IN FEMALE: Primary | ICD-10-CM

## 2019-01-21 DIAGNOSIS — N92.6 IRREGULAR PERIODS: ICD-10-CM

## 2019-01-21 DIAGNOSIS — E03.9 ACQUIRED HYPOTHYROIDISM: ICD-10-CM

## 2019-01-21 DIAGNOSIS — E66.01 MORBIDLY OBESE (HCC): ICD-10-CM

## 2019-01-21 DIAGNOSIS — G89.29 CHRONIC PELVIC PAIN IN FEMALE: Primary | ICD-10-CM

## 2019-01-21 DIAGNOSIS — K57.32 DIVERTICULITIS OF LARGE INTESTINE WITHOUT PERFORATION OR ABSCESS WITHOUT BLEEDING: ICD-10-CM

## 2019-01-21 PROCEDURE — 99213 OFFICE O/P EST LOW 20 MIN: CPT | Performed by: ADVANCED PRACTICE MIDWIFE

## 2019-01-22 ENCOUNTER — OFFICE VISIT (OUTPATIENT)
Dept: GASTROENTEROLOGY | Facility: CLINIC | Age: 24
End: 2019-01-22

## 2019-01-22 VITALS
HEART RATE: 76 BPM | DIASTOLIC BLOOD PRESSURE: 74 MMHG | OXYGEN SATURATION: 97 % | BODY MASS INDEX: 45.99 KG/M2 | HEIGHT: 67 IN | SYSTOLIC BLOOD PRESSURE: 128 MMHG | WEIGHT: 293 LBS

## 2019-01-22 DIAGNOSIS — R10.84 GENERALIZED ABDOMINAL PAIN: Primary | ICD-10-CM

## 2019-01-22 DIAGNOSIS — R11.2 NAUSEA AND VOMITING, INTRACTABILITY OF VOMITING NOT SPECIFIED, UNSPECIFIED VOMITING TYPE: ICD-10-CM

## 2019-01-22 DIAGNOSIS — R76.0 RAISED ANTIBODY TITER: ICD-10-CM

## 2019-01-22 DIAGNOSIS — R93.3 ABNORMAL CT SCAN, COLON: ICD-10-CM

## 2019-01-22 LAB
BAKER'S YEAST IGG QN IA: 59 UNITS (ref 0–50)
CHITOBIOSIDE IGA SERPL IA-ACNC: 26 UNITS (ref 0–90)
LABORATORY COMMENT REPORT: ABNORMAL
LAMINARIBIOSIDE IGG SERPL IA-ACNC: 43 UNITS (ref 0–60)
MANNOBIOSIDE IGG SERPL IA-ACNC: 135 UNITS (ref 0–100)
P-ANCA ATYPICAL TITR SER IF: NEGATIVE {TITER}

## 2019-01-22 PROCEDURE — 99214 OFFICE O/P EST MOD 30 MIN: CPT | Performed by: NURSE PRACTITIONER

## 2019-01-22 RX ORDER — DEXTROSE AND SODIUM CHLORIDE 5; .45 G/100ML; G/100ML
30 INJECTION, SOLUTION INTRAVENOUS CONTINUOUS PRN
Status: CANCELLED | OUTPATIENT
Start: 2019-01-30

## 2019-01-22 RX ORDER — SODIUM, POTASSIUM,MAG SULFATES 17.5-3.13G
1 SOLUTION, RECONSTITUTED, ORAL ORAL EVERY 12 HOURS
Qty: 2 BOTTLE | Refills: 0 | Status: SHIPPED | OUTPATIENT
Start: 2019-01-22 | End: 2019-01-30 | Stop reason: HOSPADM

## 2019-01-22 NOTE — PROGRESS NOTES
Chief Complaint   Patient presents with   • Diverticulitis       Subjective    Elysia Hernandez is a 23 y.o. female. she is here today for follow-up.    History of Present Illness  23-year-old female presents to discuss CT results.  She was treated for diverticulitis in March 2017 and November 2018.  She took Cipro and Flagyl without significant improvement of her symptoms were noted severe abdominal pain and diarrhea.  She has history of implanted IUD, Skyls IUD for 6 weeks and developed ovarian abscess was hospitalized at [facility] so has never been able to undergo colonoscopy (2017).    She denies any fever, nausea, vomiting or changes in her habits but reports generalized abdominal pain and states bowel habits have not been normal since onset of symptoms in 2017.  We repeated CT which noted inflammatory process more likely pelvic inflammatory disease and treated patient for this with Rocephin and doxycycline.  She again noted no significant improvement in symptoms.  She underwent an MRI of the pelvis to rule out fistula however that recommended CT with rectal contrast.  CT completed 1/17/19 noted focal abnormal segment of sigmoid colon with bowel wall thickening and luminal narrowing of approximately 6cm in length.  This segment of bowel was abnormal on prior CT March 6, 2017.  However on the prior exam it was more suggestive of diverticulitis.  There are no significant pericolonic inflammatory changes on today's exam.  Some subtle pericolonic nodularity is observed.  Differential considerations include focus of chronic diverticulitis chronic focus of atypical inflammatory bowel disease, colitis or much less likely an area of neoplasia. no fistula was observed.  We obtained IBD panel which was suggestive of Crohn's disease with aggressive behavior.  Plan; schedule patient for colonoscopy due to multiple abnormal CT of colon along with positive IBD panel, diarrhea and abdominal pain. Schedule patient  for EGD due to generalized abdominal pain, nausea and vomiting.       The following portions of the patient's history were reviewed and updated as appropriate:   Past Medical History:   Diagnosis Date   • Acne    • Acquired hypothyroidism    • Acute pharyngitis    • Amenorrhea    • Carbuncle of groin    • Excessive and frequent menstruation with irregular cycle    • FH: blood disorder    • GERD (gastroesophageal reflux disease)    • Hidradenitis suppurativa    • Hirsutism    • Hypothyroidism    • Irregular periods    • Metabolic syndrome X    • Obesity    • Thyroid disease    • Unspecified vitamin D deficiency      Past Surgical History:   Procedure Laterality Date   • INJECTION OF MEDICATION  2016    Toradol (1)     • OVARIAN CYST SURGERY Left 2017   • WISDOM TOOTH EXTRACTION       Family History   Problem Relation Age of Onset   • Heart disease Other         Grandmother   • Stroke Other         Grandmother   • Hypertension Other         Mother and Father   • Diabetes Other         Mother and Grandmother   • Liver cancer Other         Grandfather   • Hypertension Father    • Diabetes Mother    • Hypertension Mother    • Hyperlipidemia Mother    • No Known Problems Sister    • Diabetes Maternal Grandmother    • Heart disease Maternal Grandmother    • Stroke Maternal Grandmother    • Colon cancer Maternal Grandmother    • Liver cancer Maternal Grandfather      OB History      Para Term  AB Living    0 0 0 0 0 0    SAB TAB Ectopic Molar Multiple Live Births    0 0 0   0          Current Outpatient Medications   Medication Sig Dispense Refill   • levothyroxine (SYNTHROID) 100 MCG tablet Take 1 tablet by mouth Daily. 90 tablet 3   • sodium-potassium-magnesium sulfates (SUPREP BOWEL PREP KIT) 17.5-3.13-1.6 GM/177ML solution oral solution Take 1 bottle by mouth Every 12 (Twelve) Hours. 2 bottle 0     No current facility-administered medications for this visit.      Allergies   Allergen Reactions   •  "Aspirin Other (See Comments)     Eye swelling       Social History     Socioeconomic History   • Marital status:      Spouse name: Not on file   • Number of children: Not on file   • Years of education: Not on file   • Highest education level: Not on file   Occupational History   • Occupation: Beautician     Comment: Patient resides with    Tobacco Use   • Smoking status: Never Smoker   • Smokeless tobacco: Never Used   Substance and Sexual Activity   • Alcohol use: Yes     Comment: 12/05/2018 - Patient reports consumption of 1 alcoholic beverage per month.   • Drug use: No   • Sexual activity: Yes     Partners: Male     Birth control/protection: None       Review of Systems  Review of Systems   Constitutional: Positive for fatigue. Negative for activity change, appetite change, chills, diaphoresis, fever and unexpected weight change.   HENT: Negative for sore throat and trouble swallowing.    Respiratory: Negative for shortness of breath.    Gastrointestinal: Positive for abdominal pain (intermittent ), blood in stool (intermittent blood and mucus ), nausea and vomiting (1x time last week ). Negative for abdominal distention, anal bleeding, constipation, diarrhea (4-5 \"loose\" per day ) and rectal pain.   Musculoskeletal: Negative for arthralgias.   Skin: Negative for pallor.   Neurological: Negative for light-headedness.        /74 (BP Location: Left arm)   Pulse 76   Ht 170.2 cm (67\")   Wt (!) 160 kg (352 lb 6.4 oz)   SpO2 97%   BMI 55.19 kg/m²     Objective    Physical Exam   Constitutional: She is oriented to person, place, and time. She appears well-developed and well-nourished. She is cooperative. No distress.   HENT:   Head: Normocephalic and atraumatic.   Neck: Normal range of motion. Neck supple. No thyromegaly present.   Cardiovascular: Normal rate, regular rhythm and normal heart sounds.   Pulmonary/Chest: Effort normal and breath sounds normal. She has no wheezes. She has no " rhonchi. She has no rales.   Abdominal: Soft. Normal appearance and bowel sounds are normal. She exhibits no distension. There is no hepatosplenomegaly. There is generalized tenderness (worsened in left lower ). There is no rigidity and no guarding. No hernia.   Lymphadenopathy:     She has no cervical adenopathy.   Neurological: She is alert and oriented to person, place, and time.   Skin: Skin is warm, dry and intact. No rash noted. No pallor.   Psychiatric: She has a normal mood and affect. Her speech is normal.     Lab on 01/17/2019   Component Date Value Ref Range Status   • HCG, Urine QL 01/17/2019 Negative  Negative Final   • [place]one 01/17/2019 59* 0 - 50 units Final                                     Negative        <45                                   Equivocal   45 - 50                                   Positive        >50   • ACCA 01/17/2019 26  0 - 90 units Final                                     Negative        <80                                   Equivocal   80 - 90                                   Positive        >90   • ALCA 01/17/2019 43  0 - 60 units Final                                     Negative        <55                                   Equivocal   55 - 60                                   Positive        >60   • AMCA 01/17/2019 135* 0 - 100 units Final                                     Negative        < 90                                   Equivocal   90 - 100                                   Positive        >100   This test was developed and its performance   characteristics determined by [facility].  It has not   been cleared or approved by the Food and Drug   Administration. The FDA has determined that such   clearance or approval is not necessary.   • Atypical pANCA 01/17/2019 Negative  Negative Final   • Comment 01/17/2019 Comment*  Final    Suggestive of Crohn's Disease with high risk of aggressive disease  behavior (development of strictures or fistulae)     Assessment/Plan       1. Generalized abdominal pain    2. Abnormal CT scan, colon    3. Nausea and vomiting, intractability of vomiting not specified, unspecified vomiting type    4. Raised antibody titer    .       Orders placed during this encounter include:  Orders Placed This Encounter   Procedures   • Follow Anesthesia Guidelines / Standing Orders     Standing Status:   Future       ESOPHAGOGASTRODUODENOSCOPY URGENT (N/A), COLONOSCOPY (N/A)    Review and/or summary of lab tests, radiology, procedures, medications. Review and summary of old records and obtaining of history. The risks and benefits of my recommendations, as well as other treatment options were discussed with the patient today. Questions were answered.    New Medications Ordered This Visit   Medications   • sodium-potassium-magnesium sulfates (SUPREP BOWEL PREP KIT) 17.5-3.13-1.6 GM/177ML solution oral solution     Sig: Take 1 bottle by mouth Every 12 (Twelve) Hours.     Dispense:  2 bottle     Refill:  0       Follow-up: Return in about 2 weeks (around 2/5/2019).          This document has been electronically signed by BHARAT Fried on January 28, 2019 3:43 PM             Results for orders placed or performed in visit on 01/17/19   IBD Expanded Panel   Result Value Ref Range    Tyrone 59 (H) 0 - 50 units    ACCA 26 0 - 90 units    ALCA 43 0 - 60 units    AMCA 135 (H) 0 - 100 units    Atypical pANCA Negative Negative    Comment Comment (A)    Pregnancy, Urine - Urine, Clean Catch   Result Value Ref Range    HCG, Urine QL Negative Negative   Results for orders placed or performed in visit on 11/16/18   Insulin, Free & Total, Serum   Result Value Ref Range    Insulin, Free 46 (H) uU/mL    Insulin 46 uU/mL   TSH   Result Value Ref Range    TSH 3.040 0.460 - 4.680 mIU/mL   T4, Free   Result Value Ref Range    Free T4 1.21 0.78 - 2.19 ng/dL   Hemoglobin A1c   Result Value Ref Range    Hemoglobin A1C 5.7 (H) 4 - 5.6 %   Results for orders placed or performed in visit  on 11/07/18   Urinalysis With Culture If Indicated - Urine, Clean Catch   Result Value Ref Range    Color, UA Yellow Yellow, Straw    Appearance, UA Clear Clear    pH, UA 5.5 5.5 - 8.0    Specific Gravity, UA 1.020 1.005 - 1.030    Glucose, UA Negative Negative    Ketones, UA Negative Negative    Bilirubin, UA Negative Negative    Blood, UA Negative Negative    Protein, UA Negative Negative    Leuk Esterase, UA Negative Negative    Nitrite, UA Negative Negative    Urobilinogen, UA 0.2 E.U./dL 0.2 - 1.0 E.U./dL   CBC Auto Differential   Result Value Ref Range    WBC 13.01 (H) 3.20 - 9.80 10*3/mm3    RBC 4.72 3.77 - 5.16 10*6/mm3    Hemoglobin 14.6 12.0 - 15.5 g/dL    Hematocrit 44.4 35.0 - 45.0 %    MCV 94.1 80.0 - 98.0 fL    MCH 30.9 26.5 - 34.0 pg    MCHC 32.9 31.4 - 36.0 g/dL    RDW 13.3 11.5 - 14.5 %    RDW-SD 44.5 36.4 - 46.3 fl    MPV 11.3 8.0 - 12.0 fL    Platelets 261 150 - 450 10*3/mm3    Neutrophil % 72.1 37.0 - 80.0 %    Lymphocyte % 18.5 10.0 - 50.0 %    Monocyte % 7.8 0.0 - 12.0 %    Eosinophil % 1.4 0.0 - 7.0 %    Basophil % 0.2 0.0 - 2.0 %    Neutrophils, Absolute 9.39 (H) 2.00 - 8.60 10*3/mm3    Lymphocytes, Absolute 2.41 0.60 - 4.20 10*3/mm3    Monocytes, Absolute 1.01 (H) 0.00 - 0.90 10*3/mm3    Eosinophils, Absolute 0.18 0.00 - 0.70 10*3/mm3    Basophils, Absolute 0.02 0.00 - 0.20 10*3/mm3   Pregnancy, Urine - Urine, Clean Catch   Result Value Ref Range    HCG, Urine QL Negative Negative   Sedimentation Rate   Result Value Ref Range    Sed Rate 20 0 - 20 mm/hr   Lipase   Result Value Ref Range    Lipase 94 23 - 300 U/L   Amylase   Result Value Ref Range    Amylase 76 30 - 110 U/L   Comprehensive Metabolic Panel   Result Value Ref Range    Glucose 101 (H) 74 - 99 mg/dL    BUN 13 7 - 17 mg/dL    Creatinine 0.96 0.52 - 1.04 mg/dL    Sodium 144 137 - 145 mmol/L    Potassium 4.5 3.4 - 5.0 mmol/L    Chloride 108 (H) 98 - 107 mmol/L    CO2 28.0 22.0 - 30.0 mmol/L    Calcium 9.2 8.4 - 10.2 mg/dL    Total  Protein 8.3 (H) 6.3 - 8.2 g/dL    Albumin 4.30 3.50 - 5.00 g/dL    ALT (SGPT) 64 (H) <=35 U/L    AST (SGOT) 47 (H) 14 - 36 U/L    Alkaline Phosphatase 67 38 - 126 U/L    Total Bilirubin 0.5 0.2 - 1.3 mg/dL    eGFR Non  Amer 71 71 - 165 mL/min/1.73    Globulin 4.0 (H) 2.3 - 3.5 gm/dL    A/G Ratio 1.1 1.1 - 1.8 g/dL    BUN/Creatinine Ratio 13.5 7.0 - 25.0    Anion Gap 8.0 5.0 - 15.0 mmol/L   Results for orders placed or performed in visit on 03/08/18   Vitamin D 25 hydroxy   Result Value Ref Range    25 Hydroxy, Vitamin D 18.1 (L) 30.0 - 100.0 ng/ml   Insulin, Total   Result Value Ref Range    Insulin 54.9 (H) 2.6 - 24.9 uIU/mL   TSH   Result Value Ref Range    TSH 8.350 (H) 0.460 - 4.680 mIU/mL   Hemoglobin A1c   Result Value Ref Range    Hemoglobin A1C 5.5 4 - 5.6 %   Lipid panel   Result Value Ref Range    Total Cholesterol 128 (L) 150 - 200 mg/dL    Triglycerides 57 35 - 160 mg/dL    HDL Cholesterol 53 35 - 100 mg/dL    LDL Cholesterol  64 mg/dL    VLDL Cholesterol 11.4 mg/dL    LDL/HDL Ratio 1.20    Comprehensive metabolic panel   Result Value Ref Range    Glucose 110 (H) 70 - 100 mg/dL    BUN 15 8 - 25 mg/dL    Creatinine 0.70 0.40 - 1.30 mg/dL    Sodium 139 134 - 146 mmol/L    Potassium 4.1 3.4 - 5.4 mmol/L    Chloride 106 100 - 112 mmol/L    CO2 26.0 20.0 - 32.0 mmol/L    Calcium 8.7 8.4 - 10.8 mg/dL    Total Protein 7.3 6.7 - 8.2 g/dL    Albumin 3.80 3.20 - 5.50 g/dL    ALT (SGPT) 32 10 - 60 U/L    AST (SGOT) 26 10 - 60 U/L    Alkaline Phosphatase 53 15 - 121 U/L    Total Bilirubin 0.6 0.2 - 1.0 mg/dL    eGFR Non  Amer 103 71 - 165 mL/min/1.73    Globulin 3.5 2.5 - 4.6 gm/dL    A/G Ratio 1.1 1.0 - 3.0 g/dL    BUN/Creatinine Ratio 21.4 7.0 - 25.0    Anion Gap 7.0 5.0 - 15.0 mmol/L   Results for orders placed or performed in visit on 07/26/17   Urinalysis w/Culture if Indicated   Result Value Ref Range    Color, UA Yellow Yellow, Straw    Appearance, UA Cloudy (A) Clear    pH, UA 5.5 5.5 - 8.0     Specific Gravity, UA 1.020 1.005 - 1.030    Glucose, UA Negative Negative    Ketones, UA Negative Negative    Bilirubin, UA Negative Negative    Blood, UA Negative Negative    Protein, UA Negative Negative    Leuk Esterase, UA Negative Negative    Nitrite, UA Negative Negative    Urobilinogen, UA 0.2 E.U./dL 0.2 - 1.0 E.U./dL     *Note: Due to a large number of results and/or encounters for the requested time period, some results have not been displayed. A complete set of results can be found in Results Review.

## 2019-01-30 ENCOUNTER — ANESTHESIA (OUTPATIENT)
Dept: GASTROENTEROLOGY | Facility: HOSPITAL | Age: 24
End: 2019-01-30

## 2019-01-30 ENCOUNTER — ANESTHESIA EVENT (OUTPATIENT)
Dept: GASTROENTEROLOGY | Facility: HOSPITAL | Age: 24
End: 2019-01-30

## 2019-01-30 ENCOUNTER — HOSPITAL ENCOUNTER (OUTPATIENT)
Facility: HOSPITAL | Age: 24
Setting detail: HOSPITAL OUTPATIENT SURGERY
Discharge: HOME | End: 2019-01-30
Attending: INTERNAL MEDICINE | Admitting: INTERNAL MEDICINE

## 2019-01-30 VITALS
SYSTOLIC BLOOD PRESSURE: 131 MMHG | DIASTOLIC BLOOD PRESSURE: 73 MMHG | HEART RATE: 76 BPM | OXYGEN SATURATION: 98 % | BODY MASS INDEX: 45.99 KG/M2 | RESPIRATION RATE: 18 BRPM | TEMPERATURE: 97 F | HEIGHT: 67 IN | WEIGHT: 293 LBS

## 2019-01-30 DIAGNOSIS — R93.3 ABNORMAL CT SCAN, COLON: ICD-10-CM

## 2019-01-30 DIAGNOSIS — R10.84 GENERALIZED ABDOMINAL PAIN: ICD-10-CM

## 2019-01-30 LAB — B-HCG UR QL: NEGATIVE

## 2019-01-30 PROCEDURE — 25010000002 PROPOFOL 10 MG/ML EMULSION: Performed by: NURSE ANESTHETIST, CERTIFIED REGISTERED

## 2019-01-30 PROCEDURE — 88305 TISSUE EXAM BY PATHOLOGIST: CPT | Performed by: PATHOLOGY

## 2019-01-30 PROCEDURE — 88342 IMHCHEM/IMCYTCHM 1ST ANTB: CPT | Performed by: INTERNAL MEDICINE

## 2019-01-30 PROCEDURE — 45385 COLONOSCOPY W/LESION REMOVAL: CPT | Performed by: INTERNAL MEDICINE

## 2019-01-30 PROCEDURE — 81025 URINE PREGNANCY TEST: CPT | Performed by: INTERNAL MEDICINE

## 2019-01-30 PROCEDURE — 88342 IMHCHEM/IMCYTCHM 1ST ANTB: CPT | Performed by: PATHOLOGY

## 2019-01-30 PROCEDURE — 43239 EGD BIOPSY SINGLE/MULTIPLE: CPT | Performed by: INTERNAL MEDICINE

## 2019-01-30 PROCEDURE — 88305 TISSUE EXAM BY PATHOLOGIST: CPT | Performed by: INTERNAL MEDICINE

## 2019-01-30 PROCEDURE — 45380 COLONOSCOPY AND BIOPSY: CPT | Performed by: INTERNAL MEDICINE

## 2019-01-30 RX ORDER — PROMETHAZINE HYDROCHLORIDE 25 MG/1
25 SUPPOSITORY RECTAL ONCE AS NEEDED
Status: DISCONTINUED | OUTPATIENT
Start: 2019-01-30 | End: 2019-01-30 | Stop reason: HOSPADM

## 2019-01-30 RX ORDER — ONDANSETRON 2 MG/ML
4 INJECTION INTRAMUSCULAR; INTRAVENOUS ONCE AS NEEDED
Status: DISCONTINUED | OUTPATIENT
Start: 2019-01-30 | End: 2019-01-30 | Stop reason: HOSPADM

## 2019-01-30 RX ORDER — PROMETHAZINE HYDROCHLORIDE 25 MG/ML
12.5 INJECTION, SOLUTION INTRAMUSCULAR; INTRAVENOUS ONCE AS NEEDED
Status: DISCONTINUED | OUTPATIENT
Start: 2019-01-30 | End: 2019-01-30 | Stop reason: HOSPADM

## 2019-01-30 RX ORDER — PROMETHAZINE HYDROCHLORIDE 25 MG/1
25 TABLET ORAL ONCE AS NEEDED
Status: DISCONTINUED | OUTPATIENT
Start: 2019-01-30 | End: 2019-01-30 | Stop reason: HOSPADM

## 2019-01-30 RX ORDER — PROPOFOL 10 MG/ML
VIAL (ML) INTRAVENOUS AS NEEDED
Status: DISCONTINUED | OUTPATIENT
Start: 2019-01-30 | End: 2019-01-30 | Stop reason: SURG

## 2019-01-30 RX ORDER — DEXAMETHASONE SODIUM PHOSPHATE 4 MG/ML
8 INJECTION, SOLUTION INTRA-ARTICULAR; INTRALESIONAL; INTRAMUSCULAR; INTRAVENOUS; SOFT TISSUE ONCE AS NEEDED
Status: DISCONTINUED | OUTPATIENT
Start: 2019-01-30 | End: 2019-01-30 | Stop reason: HOSPADM

## 2019-01-30 RX ORDER — LIDOCAINE HYDROCHLORIDE 10 MG/ML
INJECTION, SOLUTION INFILTRATION; PERINEURAL AS NEEDED
Status: DISCONTINUED | OUTPATIENT
Start: 2019-01-30 | End: 2019-01-30 | Stop reason: SURG

## 2019-01-30 RX ORDER — DEXTROSE AND SODIUM CHLORIDE 5; .45 G/100ML; G/100ML
30 INJECTION, SOLUTION INTRAVENOUS CONTINUOUS PRN
Status: DISCONTINUED | OUTPATIENT
Start: 2019-01-30 | End: 2019-01-30 | Stop reason: HOSPADM

## 2019-01-30 RX ADMIN — DEXTROSE AND SODIUM CHLORIDE 30 ML/HR: 5; 450 INJECTION, SOLUTION INTRAVENOUS at 09:21

## 2019-01-30 RX ADMIN — PROPOFOL 100 MG: 10 INJECTION, EMULSION INTRAVENOUS at 10:10

## 2019-01-30 RX ADMIN — PROPOFOL 100 MG: 10 INJECTION, EMULSION INTRAVENOUS at 10:00

## 2019-01-30 RX ADMIN — PROPOFOL 150 MG: 10 INJECTION, EMULSION INTRAVENOUS at 09:56

## 2019-01-30 RX ADMIN — PROPOFOL 100 MG: 10 INJECTION, EMULSION INTRAVENOUS at 10:05

## 2019-01-30 RX ADMIN — LIDOCAINE HYDROCHLORIDE 100 MG: 10 INJECTION, SOLUTION INFILTRATION; PERINEURAL at 09:56

## 2019-01-30 NOTE — ANESTHESIA PREPROCEDURE EVALUATION
Anesthesia Evaluation     Patient summary reviewed and Nursing notes reviewed   NPO Solid Status: > 8 hours  NPO Liquid Status: > 8 hours           Airway   No difficulty expected  Dental      Pulmonary    Cardiovascular - normal exam        Neuro/Psych  GI/Hepatic/Renal/Endo    (+) obesity, morbid obesity, GERD poorly controlled,  hypothyroidism,     Musculoskeletal     Abdominal   (+) obese,    Substance History      OB/GYN          Other                        Anesthesia Plan    ASA 3     MAC     intravenous induction   Anesthetic plan, all risks, benefits, and alternatives have been provided, discussed and informed consent has been obtained with: patient.    Plan discussed with CRNA.

## 2019-01-30 NOTE — ANESTHESIA POSTPROCEDURE EVALUATION
Patient: Elysia Heranndez    Procedure Summary     Date:  01/30/19 Room / Location:  [facility] ENDOSCOPY 3 / [facility] ENDOSCOPY    Anesthesia Start:  0954 Anesthesia Stop:  1019    Procedures:       ESOPHAGOGASTRODUODENOSCOPY URGENT (N/A )      COLONOSCOPY (N/A ) Diagnosis:       Generalized abdominal pain      Abnormal CT scan, colon      (Generalized abdominal pain [R10.84])      (Abnormal CT scan, colon [R93.3])    Surgeon:  Howie Rod MD Provider:  Jean-Paul Guillory CRNA    Anesthesia Type:  MAC ASA Status:  3          Anesthesia Type: MAC  Last vitals  BP   147/88 (01/30/19 0912)   Temp   97.6 °F (36.4 °C) (01/30/19 0912)   Pulse   72 (01/30/19 0912)   Resp   16 (01/30/19 0912)     SpO2   98 % (01/30/19 0912)     Post Anesthesia Care and Evaluation    Patient location during evaluation: PACU  Patient participation: complete - patient participated  Level of consciousness: awake and alert  Pain score: 1  Pain management: adequate  Airway patency: patent  Anesthetic complications: No anesthetic complications  PONV Status: none  Cardiovascular status: acceptable  Respiratory status: acceptable  Hydration status: acceptable

## 2019-01-31 LAB
LAB AP CASE REPORT: NORMAL
LAB AP DIAGNOSIS COMMENT: NORMAL
PATH REPORT.FINAL DX SPEC: NORMAL
PATH REPORT.GROSS SPEC: NORMAL

## 2019-02-01 NOTE — PROGRESS NOTES
Subjective   Elysia Hernandez is a 23 y.o. female.     Elysia is an obese female who presents with chronic pelvic pain. She has a history of PID following an IUD placement 1.5 years ago. She denies a history of STI.  The IUD was removed after 6 weeks.   She was eventually hospitalized for PID & at that time was diagnosed with diverticulosis & left ovarian abscess.   The abscess on the left ovary was removed. She states a specialist in [place] states that she cannot get pregnant due to severe scarring of bilateral fallopian tubes.   She has chronic pelvic pain which has recently gotten worse, especially on the left side.  She has irregular periods with normal flow & cramping.  She has recently been told that the PID has return & she received an injection of Rocephin 250 mg  She denies any fever, chills, or vaginal discharge/odor         The following portions of the patient's history were reviewed and updated as appropriate: allergies, current medications, past family history, past medical history, past social history, past surgical history and problem list.    Review of Systems   Constitutional: Negative.    HENT: Negative.    Eyes: Negative.    Respiratory: Negative.    Cardiovascular: Negative.    Gastrointestinal: Positive for abdominal pain.   Endocrine: Negative.    Genitourinary: Positive for menstrual problem and pelvic pain.   Musculoskeletal: Negative.    Allergic/Immunologic: Negative.    Neurological: Negative.    Hematological: Negative.    Psychiatric/Behavioral: Negative.        Objective   Physical Exam   Constitutional: She is oriented to person, place, and time. She appears well-developed and well-nourished.   HENT:   Head: Normocephalic and atraumatic.   Eyes: Conjunctivae and EOM are normal. Pupils are equal, round, and reactive to light.   Pulmonary/Chest: Effort normal.   Musculoskeletal: Normal range of motion.   Neurological: She is alert and oriented to person, place, and time.   Skin:  Skin is warm and dry.   Psychiatric: She has a normal mood and affect. Her behavior is normal. Judgment and thought content normal.         Assessment/Plan   Elysia was seen today for pelvic inflammatory disease.    Diagnoses and all orders for this visit:    Chronic pelvic pain in female  -     US Non-ob Transvaginal; Future    Morbidly obese (CMS/HCC)    Adnexal pain  -     US Non-ob Transvaginal; Future    Acquired hypothyroidism    Diverticulitis of large intestine without perforation or abscess without bleeding    Irregular periods    1. TVUS  2. Consult with Dr. Worley

## 2019-02-06 ENCOUNTER — OFFICE VISIT (OUTPATIENT)
Dept: OBSTETRICS AND GYNECOLOGY | Facility: CLINIC | Age: 24
End: 2019-02-06

## 2019-02-06 ENCOUNTER — OFFICE VISIT (OUTPATIENT)
Dept: GASTROENTEROLOGY | Facility: CLINIC | Age: 24
End: 2019-02-06

## 2019-02-06 VITALS
SYSTOLIC BLOOD PRESSURE: 122 MMHG | WEIGHT: 293 LBS | HEART RATE: 87 BPM | HEIGHT: 67 IN | DIASTOLIC BLOOD PRESSURE: 86 MMHG | BODY MASS INDEX: 45.99 KG/M2

## 2019-02-06 VITALS
HEIGHT: 67 IN | BODY MASS INDEX: 45.99 KG/M2 | SYSTOLIC BLOOD PRESSURE: 100 MMHG | DIASTOLIC BLOOD PRESSURE: 72 MMHG | WEIGHT: 293 LBS

## 2019-02-06 DIAGNOSIS — N83.201 OVARIAN CYST, RIGHT: ICD-10-CM

## 2019-02-06 DIAGNOSIS — K52.9 COLITIS: Primary | ICD-10-CM

## 2019-02-06 DIAGNOSIS — R10.2 PELVIC PAIN: Primary | ICD-10-CM

## 2019-02-06 PROCEDURE — 99214 OFFICE O/P EST MOD 30 MIN: CPT | Performed by: NURSE PRACTITIONER

## 2019-02-06 PROCEDURE — 99214 OFFICE O/P EST MOD 30 MIN: CPT | Performed by: OBSTETRICS & GYNECOLOGY

## 2019-02-06 RX ORDER — MESALAMINE 0.38 G/1
1500 CAPSULE, EXTENDED RELEASE ORAL DAILY
Qty: 120 CAPSULE | Refills: 2 | Status: SHIPPED | OUTPATIENT
Start: 2019-02-06

## 2019-02-06 NOTE — PROGRESS NOTES
Chief Complaint   Patient presents with   • Diverticulitis       Subjective    Elysia Hernandez is a 23 y.o. female. she is here today for follow-up.    History of Present Illness  23-year-old female presents to discuss colonoscopy result.  She has history of diverticulitis and repeat CT noted focal of abnormal segment of sigmoid with bowel wall thickening and luminal narrowing approximately 6 cm in length.  We obtained IBD panel which was suggestive of Crohn's disease with aggressive behavior.  Patient reports she still having 3-4 loose bowel movements per day with intermittent blood in stool.  Describes constant abdominal tenderness but denies any severe pain.  Colonoscopy had adequate prep and noted diverticula in the sigmoid and descending colon.  A small polyp was found and removed from sigmoid colon.  Polyp was hyperplastic.  Localized area of mildly erythematous mucosa was found in the rectum and several other biopsies were obtained.  Rectal biopsy noted no significant abnormality, terminal ileum and random colonic biopsy noted no significant histologic abnormality.  EGD noted mildly severe esophagitis, gastritis normal duodenum antral biopsy notes chronic gastritis.  Negative for H. pylori.  Distal esophagus biopsy noted reactive changes squamous mucosa.  Plan; have discussed PPI daily for esophagitis and gastritis seen on EGD.  Will start patient on a pre-so due to colitis seen on CT and visualized on colonoscopy discussed with patient will try this for 3 months and discontinue if symptoms have improved.  Discussed with patient that I believe this is chronic inflammatory bowel disease despite biopsy results based on clinical course, labs and colonoscopy images.  Follow-up in GI office in 3 months  sooner if needed.       The following portions of the patient's history were reviewed and updated as appropriate:   Past Medical History:   Diagnosis Date   • Acne    • Acquired hypothyroidism    • Acute  pharyngitis    • Amenorrhea    • Carbuncle of groin    • Excessive and frequent menstruation with irregular cycle    • FH: blood disorder    • GERD (gastroesophageal reflux disease)    • Hidradenitis suppurativa    • Hirsutism    • Hypothyroidism    • Irregular periods    • Metabolic syndrome X    • Obesity    • Thyroid disease    • Unspecified vitamin D deficiency      Past Surgical History:   Procedure Laterality Date   • COLONOSCOPY N/A 2019    Procedure: COLONOSCOPY;  Surgeon: Howie Rod MD;  Location: [facility] ENDOSCOPY;  Service: Gastroenterology   • ENDOSCOPY N/A 2019    Procedure: ESOPHAGOGASTRODUODENOSCOPY URGENT;  Surgeon: Howie Rod MD;  Location: [facility] ENDOSCOPY;  Service: Gastroenterology   • INJECTION OF MEDICATION  2016    Toradol (1)     • OVARIAN CYST SURGERY Left 2017   • WISDOM TOOTH EXTRACTION       Family History   Problem Relation Age of Onset   • Heart disease Other         Grandmother   • Stroke Other         Grandmother   • Hypertension Other         Mother and Father   • Diabetes Other         Mother and Grandmother   • Liver cancer Other         Grandfather   • Hypertension Father    • Diabetes Mother    • Hypertension Mother    • Hyperlipidemia Mother    • No Known Problems Sister    • Diabetes Maternal Grandmother    • Heart disease Maternal Grandmother    • Stroke Maternal Grandmother    • Colon cancer Maternal Grandmother    • Liver cancer Maternal Grandfather      OB History      Para Term  AB Living    0 0 0 0 0 0    SAB TAB Ectopic Molar Multiple Live Births    0 0 0   0          Current Outpatient Medications   Medication Sig Dispense Refill   • levothyroxine (SYNTHROID) 100 MCG tablet Take 1 tablet by mouth Daily. 90 tablet 3   • mesalamine (APRISO) 0.375 g 24 hr capsule Take 4 capsules by mouth Daily. 120 capsule 2     No current facility-administered medications for this visit.      Allergies   Allergen Reactions   • Aspirin Other (See  "Comments)     Eye swelling       Social History     Socioeconomic History   • Marital status:      Spouse name: Not on file   • Number of children: Not on file   • Years of education: Not on file   • Highest education level: Not on file   Occupational History   • Occupation: Beautician     Comment: Patient resides with    Tobacco Use   • Smoking status: Never Smoker   • Smokeless tobacco: Never Used   Substance and Sexual Activity   • Alcohol use: Yes     Comment: 12/05/2018 - Patient reports consumption of 1 alcoholic beverage per month.   • Drug use: No   • Sexual activity: Yes     Partners: Male     Birth control/protection: None       Review of Systems  Review of Systems   Constitutional: Negative for activity change, appetite change, chills, diaphoresis, fatigue, fever and unexpected weight change.   HENT: Negative for sore throat and trouble swallowing.    Respiratory: Negative for shortness of breath.    Gastrointestinal: Positive for abdominal pain, blood in stool and diarrhea (3-4 lose stool per day ). Negative for abdominal distention, anal bleeding, constipation, nausea, rectal pain and vomiting.   Musculoskeletal: Negative for arthralgias.   Skin: Negative for pallor.   Neurological: Negative for light-headedness.        /86 (BP Location: Left arm)   Pulse 87   Ht 170.2 cm (67\")   Wt (!) 159 kg (351 lb)   BMI 54.97 kg/m²     Objective    Physical Exam   Constitutional: She is oriented to person, place, and time. She appears well-developed and well-nourished. She is cooperative. No distress.   HENT:   Head: Normocephalic and atraumatic.   Neck: Normal range of motion. Neck supple. No thyromegaly present.   Cardiovascular: Normal rate, regular rhythm and normal heart sounds.   Pulmonary/Chest: Effort normal and breath sounds normal. She has no wheezes. She has no rhonchi. She has no rales.   Abdominal: Soft. Normal appearance and bowel sounds are normal. She exhibits no distension. " There is no hepatosplenomegaly. There is tenderness in the left lower quadrant. There is no rigidity and no guarding. No hernia.   Lymphadenopathy:     She has no cervical adenopathy.   Neurological: She is alert and oriented to person, place, and time.   Skin: Skin is warm, dry and intact. No rash noted. No pallor.   Psychiatric: She has a normal mood and affect. Her speech is normal.     Admission on 01/30/2019, Discharged on 01/30/2019   Component Date Value Ref Range Status   • HCG, Urine QL 01/30/2019 Negative  Negative Final   • Case Report 01/30/2019    Final                    Value:Surgical Pathology Report                         Case: [id number]                                  Authorizing Provider:  Howie Rod MD        Collected:           01/30/2019 10:02 AM          Ordering Location:     [facility]             Received:            01/30/2019 10:46 AM                                 [facility]                                                     Pathologist:           Tim Bañuelos MD                                                         Specimens:   1) - Gastric, Antrum, antrum bx                                                                     2) - Esophagus, Distal, distal esophagus bx                                                         3) - Large Intestine, Sigmoid Colon, sigmoid colon polyp  (cold snare)                              4) - Small Intestine, Ileum, ti bx                                                                  5) - Large Intestine, colonic mucosa bx                                                                                       6) - Large Intestine, Rectum, rectum bx                                                   • Final Diagnosis 01/30/2019    Final                    Value:This result contains rich text formatting which cannot be displayed here.   • Comment 01/30/2019    Final                    Value:This result contains rich  text formatting which cannot be displayed here.   • Gross Description 01/30/2019    Final                    Value:This result contains rich text formatting which cannot be displayed here.     Assessment/Plan      1. Colitis    .       Orders placed during this encounter include:  No orders of the defined types were placed in this encounter.      * Surgery not found *    Review and/or summary of lab tests, radiology, procedures, medications. Review and summary of old records and obtaining of history. The risks and benefits of my recommendations, as well as other treatment options were discussed with the patient today. Questions were answered.    New Medications Ordered This Visit   Medications   • mesalamine (APRISO) 0.375 g 24 hr capsule     Sig: Take 4 capsules by mouth Daily.     Dispense:  120 capsule     Refill:  2       Follow-up: Return in about 3 months (around 5/6/2019).          This document has been electronically signed by BHARAT Fried on February 6, 2019 3:01 PM             Results for orders placed or performed during the hospital encounter of 01/30/19   Tissue Pathology Exam   Result Value Ref Range    Case Report       Surgical Pathology Report                         Case: [id number]                                  Authorizing Provider:  Howie Rod MD        Collected:           01/30/2019 10:02 AM          Ordering Location:     [facility]             Received:            01/30/2019 10:46 AM                                 [facility]                                                     Pathologist:           Tim Bañuelos MD                                                         Specimens:   1) - Gastric, Antrum, antrum bx                                                                     2) - Esophagus, Distal, distal esophagus bx                                                         3) - Large Intestine, Sigmoid Colon, sigmoid colon polyp  (cold snare)                               4) - Small Intestine, Ileum, ti bx                                                                  5) - Large Intestine, colonic mucosa bx                                                              6) - Large Intestine, Rectum, rectum bx                                                    Final Diagnosis       1.  MUCOSA, ANTRUM OF STOMACH:  CHRONIC GASTRITIS.  NEGATIVE FOR HELICOBACTER PYLORI (HP IMMUNOSTAIN).    2.  MUCOSA, DISTAL ESOPHAGUS:  REACTIVE CHANGE OF SQUAMOUS MUCOSA.    3.  POLYP, SIGMOID COLON:  HYPERPLASTIC POLYP.    4.  MUCOSA, TERMINAL ILEUM:  NO SIGNIFICANT HISTOLOGIC ABNORMALITY.    5.  MUCOSA, COLON:  NO SIGNIFICANT HISTOLOGIC ABNORMALITY.    6.  MUCOSA, RECTUM:  NO SIGNIFICANT HISTOLOGIC ABNORMALITY.      Comment       Helicobacter pylori (HP) immunostain is performed (Block 1) because an appropriate inflammatory milieu is present and organisms are not seen on H & E stained slides.    HP immunostain was developed and its performance characteristics determined by [facility].  It has not been cleared or approved by the U.S. Food and Drug Administration.  The FDA has determined that such clearance or approval is not necessary.  This test is used for clinical purposes.  It should not be regarded as investigational or for research.  This laboratory is certified under the Clinical Laboratory Improvement Amendments of 1988 (CLIA-88) as qualified to perform high complexity clinical laboratory testing.    All controls show appropriate reactivity.          Gross Description       Received for examination are 6 containers, each of which have nodular bits of white soft tissue measuring 0.3-0.5 cc in aggregate.  All specimens are embedded as labeled.  1A antrum of stomach; 2A distal esophagus; 3A polyp, sigmoid colon; 4A terminal ileum; 5A mucosa of colon; 6A rectum.     Pregnancy, Urine - Urine, Clean Catch   Result Value Ref Range    HCG, Urine  QL Negative Negative   Results for orders placed or performed in visit on 01/17/19   IBD Expanded Panel   Result Value Ref Range    Tyrone 59 (H) 0 - 50 units    ACCA 26 0 - 90 units    ALCA 43 0 - 60 units    AMCA 135 (H) 0 - 100 units    Atypical pANCA Negative Negative    Comment Comment (A)    Pregnancy, Urine - Urine, Clean Catch   Result Value Ref Range    HCG, Urine QL Negative Negative   Results for orders placed or performed in visit on 11/16/18   Insulin, Free & Total, Serum   Result Value Ref Range    Insulin, Free 46 (H) uU/mL    Insulin 46 uU/mL   TSH   Result Value Ref Range    TSH 3.040 0.460 - 4.680 mIU/mL   T4, Free   Result Value Ref Range    Free T4 1.21 0.78 - 2.19 ng/dL   Hemoglobin A1c   Result Value Ref Range    Hemoglobin A1C 5.7 (H) 4 - 5.6 %   Results for orders placed or performed in visit on 11/07/18   Urinalysis With Culture If Indicated - Urine, Clean Catch   Result Value Ref Range    Color, UA Yellow Yellow, Straw    Appearance, UA Clear Clear    pH, UA 5.5 5.5 - 8.0    Specific Gravity, UA 1.020 1.005 - 1.030    Glucose, UA Negative Negative    Ketones, UA Negative Negative    Bilirubin, UA Negative Negative    Blood, UA Negative Negative    Protein, UA Negative Negative    Leuk Esterase, UA Negative Negative    Nitrite, UA Negative Negative    Urobilinogen, UA 0.2 E.U./dL 0.2 - 1.0 E.U./dL   CBC Auto Differential   Result Value Ref Range    WBC 13.01 (H) 3.20 - 9.80 10*3/mm3    RBC 4.72 3.77 - 5.16 10*6/mm3    Hemoglobin 14.6 12.0 - 15.5 g/dL    Hematocrit 44.4 35.0 - 45.0 %    MCV 94.1 80.0 - 98.0 fL    MCH 30.9 26.5 - 34.0 pg    MCHC 32.9 31.4 - 36.0 g/dL    RDW 13.3 11.5 - 14.5 %    RDW-SD 44.5 36.4 - 46.3 fl    MPV 11.3 8.0 - 12.0 fL    Platelets 261 150 - 450 10*3/mm3    Neutrophil % 72.1 37.0 - 80.0 %    Lymphocyte % 18.5 10.0 - 50.0 %    Monocyte % 7.8 0.0 - 12.0 %    Eosinophil % 1.4 0.0 - 7.0 %    Basophil % 0.2 0.0 - 2.0 %    Neutrophils, Absolute 9.39 (H) 2.00 - 8.60  10*3/mm3    Lymphocytes, Absolute 2.41 0.60 - 4.20 10*3/mm3    Monocytes, Absolute 1.01 (H) 0.00 - 0.90 10*3/mm3    Eosinophils, Absolute 0.18 0.00 - 0.70 10*3/mm3    Basophils, Absolute 0.02 0.00 - 0.20 10*3/mm3   Pregnancy, Urine - Urine, Clean Catch   Result Value Ref Range    HCG, Urine QL Negative Negative   Sedimentation Rate   Result Value Ref Range    Sed Rate 20 0 - 20 mm/hr   Lipase   Result Value Ref Range    Lipase 94 23 - 300 U/L   Amylase   Result Value Ref Range    Amylase 76 30 - 110 U/L   Comprehensive Metabolic Panel   Result Value Ref Range    Glucose 101 (H) 74 - 99 mg/dL    BUN 13 7 - 17 mg/dL    Creatinine 0.96 0.52 - 1.04 mg/dL    Sodium 144 137 - 145 mmol/L    Potassium 4.5 3.4 - 5.0 mmol/L    Chloride 108 (H) 98 - 107 mmol/L    CO2 28.0 22.0 - 30.0 mmol/L    Calcium 9.2 8.4 - 10.2 mg/dL    Total Protein 8.3 (H) 6.3 - 8.2 g/dL    Albumin 4.30 3.50 - 5.00 g/dL    ALT (SGPT) 64 (H) <=35 U/L    AST (SGOT) 47 (H) 14 - 36 U/L    Alkaline Phosphatase 67 38 - 126 U/L    Total Bilirubin 0.5 0.2 - 1.3 mg/dL    eGFR Non  Amer 71 71 - 165 mL/min/1.73    Globulin 4.0 (H) 2.3 - 3.5 gm/dL    A/G Ratio 1.1 1.1 - 1.8 g/dL    BUN/Creatinine Ratio 13.5 7.0 - 25.0    Anion Gap 8.0 5.0 - 15.0 mmol/L   Results for orders placed or performed in visit on 03/08/18   Vitamin D 25 hydroxy   Result Value Ref Range    25 Hydroxy, Vitamin D 18.1 (L) 30.0 - 100.0 ng/ml   Insulin, Total   Result Value Ref Range    Insulin 54.9 (H) 2.6 - 24.9 uIU/mL   TSH   Result Value Ref Range    TSH 8.350 (H) 0.460 - 4.680 mIU/mL   Hemoglobin A1c   Result Value Ref Range    Hemoglobin A1C 5.5 4 - 5.6 %   Lipid panel   Result Value Ref Range    Total Cholesterol 128 (L) 150 - 200 mg/dL    Triglycerides 57 35 - 160 mg/dL    HDL Cholesterol 53 35 - 100 mg/dL    LDL Cholesterol  64 mg/dL    VLDL Cholesterol 11.4 mg/dL    LDL/HDL Ratio 1.20    Comprehensive metabolic panel   Result Value Ref Range    Glucose 110 (H) 70 - 100 mg/dL     BUN 15 8 - 25 mg/dL    Creatinine 0.70 0.40 - 1.30 mg/dL    Sodium 139 134 - 146 mmol/L    Potassium 4.1 3.4 - 5.4 mmol/L    Chloride 106 100 - 112 mmol/L    CO2 26.0 20.0 - 32.0 mmol/L    Calcium 8.7 8.4 - 10.8 mg/dL    Total Protein 7.3 6.7 - 8.2 g/dL    Albumin 3.80 3.20 - 5.50 g/dL    ALT (SGPT) 32 10 - 60 U/L    AST (SGOT) 26 10 - 60 U/L    Alkaline Phosphatase 53 15 - 121 U/L    Total Bilirubin 0.6 0.2 - 1.0 mg/dL    eGFR Non  Amer 103 71 - 165 mL/min/1.73    Globulin 3.5 2.5 - 4.6 gm/dL    A/G Ratio 1.1 1.0 - 3.0 g/dL    BUN/Creatinine Ratio 21.4 7.0 - 25.0    Anion Gap 7.0 5.0 - 15.0 mmol/L     *Note: Due to a large number of results and/or encounters for the requested time period, some results have not been displayed. A complete set of results can be found in Results Review.

## 2019-02-06 NOTE — PATIENT INSTRUCTIONS
Mesalamine, 5-ASA extended-release capsules (Apriso)  What is this medicine?  MESALAMINE (me SAL a meen) is used to maintain remission of ulcerative colitis.  This medicine may be used for other purposes; ask your health care provider or pharmacist if you have questions.  COMMON BRAND NAME(S): Apriso  What should I tell my health care provider before I take this medicine?  They need to know if you have any of these conditions:  -kidney disease  -liver disease  -phenylketonuria  -pyloric stenosis  -an unusual or allergic reaction to mesalamine, salicylates, other medicines, foods, dyes, or preservatives  -pregnant or trying to get pregnant  -breast-feeding  How should I use this medicine?  Take this medicine by mouth with a glass of water. Follow the directions on the prescription label. Do not cut, crush, or chew this medicine. Take your medicine at regular intervals. Do not take it more often than directed. Do not stop taking except on your doctor's advice.  Talk to your pediatrician regarding the use of this medicine in children. Special care may be needed.  Overdosage: If you think you have taken too much of this medicine contact a poison control center or emergency room at once.  NOTE: This medicine is only for you. Do not share this medicine with others.  What if I miss a dose?  If you miss a dose, take it as soon as you can. If it is almost time for your next dose, take only that dose. Do not take double or extra doses.  What may interact with this medicine?  -antacids  This list may not describe all possible interactions. Give your health care provider a list of all the medicines, herbs, non-prescription drugs, or dietary supplements you use. Also tell them if you smoke, drink alcohol, or use illegal drugs. Some items may interact with your medicine.  What should I watch for while using this medicine?  Tell your doctor or healthcare professional if your symptoms get worse.  Do not change the brand of this  medicine that you are taking without talking to your doctor or health care professional. All brands do not have the same dose.  What side effects may I notice from receiving this medicine?  Side effects that you should report to your doctor or health care professional as soon as possible:  -allergic reactions like skin rash, itching or hives, swelling of the face, lips, or tongue  -bloody diarrhea  -chest pain  -difficulty breathing, wheezing  -fever  -pain or difficulty passing urine  -unusually weak or tired  -yellowing of the eyes or skin  Side effects that usually do not require medical attention (report to your doctor or health care professional if they continue or are bothersome):  -headache  -nausea, vomiting  -stomach gas  -stomach pain or cramps  This list may not describe all possible side effects. Call your doctor for medical advice about side effects. You may report side effects to FDA at 7-175-FDA-8803.  Where should I keep my medicine?  Keep out of the reach of children.  Store at room temperature between 20 and 25 degrees C (68 and 77 degrees F). Throw away any unused medicine after the expiration date.  NOTE: This sheet is a summary. It may not cover all possible information. If you have questions about this medicine, talk to your doctor, pharmacist, or health care provider.  © 2018 [employer] (2009-02-27 17:04:00)

## 2019-02-10 PROBLEM — N83.201 OVARIAN CYST, RIGHT: Status: ACTIVE | Noted: 2019-02-10

## 2019-02-10 PROBLEM — R10.2 PELVIC PAIN: Status: ACTIVE | Noted: 2019-02-10

## 2019-02-10 NOTE — PROGRESS NOTES
Elysia Hernandez is a 23 y.o. y/o female.     Chief Complaint: Pelvic pain    HPI:   23 y.o. y/o .  Patient's last menstrual period was 01/15/2019 (approximate)..  Patient complains of pelvic pain pain is in the lower pelvis sharp is intermittent.  No pattern of radiation made somewhat better by nonsteroidal anti-inflammatories.  It is 7 out of 10 at worst          Review of Systems   ROS:  CNS: No history of brain malignancy  HEENT: No history of throat cancer  Eye: No history of retinal cancer  Pulmonary: No history of lung cancer                                                                                 Cardiovascular: No history of cardiac tumors  Gastrointestinal: No history of small bowel tumors  Renal: No history of kidney  Musculoskeletal: No history of smooth muscle tumors  Lymphatics: No history of of Hodgkin's disease  Endocrine: No history of thyroid malignancy    The following portions of the patient's history were reviewed and updated as appropriate: allergies, current medications, past family history, past medical history, past social history, past surgical history and problem list.    Allergies   Allergen Reactions   • Aspirin Other (See Comments)     Eye swelling          Outpatient Medications Prior to Visit   Medication Sig Dispense Refill   • levothyroxine (SYNTHROID) 100 MCG tablet Take 1 tablet by mouth Daily. 90 tablet 3   • mesalamine (APRISO) 0.375 g 24 hr capsule Take 4 capsules by mouth Daily. 120 capsule 2     No facility-administered medications prior to visit.         The patient has a family history of   Family History   Problem Relation Age of Onset   • Heart disease Other         Grandmother   • Stroke Other         Grandmother   • Hypertension Other         Mother and Father   • Diabetes Other         Mother and Grandmother   • Liver cancer Other         Grandfather   • Hypertension Father    • Diabetes Mother    • Hypertension Mother    • Hyperlipidemia Mother    •  No Known Problems Sister    • Diabetes Maternal Grandmother    • Heart disease Maternal Grandmother    • Stroke Maternal Grandmother    • Colon cancer Maternal Grandmother    • Liver cancer Maternal Grandfather         Past Medical History:   Diagnosis Date   • Acne    • Acquired hypothyroidism    • Acute pharyngitis    • Amenorrhea    • Carbuncle of groin    • Excessive and frequent menstruation with irregular cycle    • FH: blood disorder    • GERD (gastroesophageal reflux disease)    • Hidradenitis suppurativa    • Hirsutism    • Hypothyroidism    • Irregular periods    • Metabolic syndrome X    • Obesity    • Thyroid disease    • Unspecified vitamin D deficiency         OB History      Para Term  AB Living    0 0 0 0 0 0    SAB TAB Ectopic Molar Multiple Live Births    0 0 0   0             Social History     Socioeconomic History   • Marital status:      Spouse name: Not on file   • Number of children: Not on file   • Years of education: Not on file   • Highest education level: Not on file   Social Needs   • Financial resource strain: Not on file   • Food insecurity - worry: Not on file   • Food insecurity - inability: Not on file   • Transportation needs - medical: Not on file   • Transportation needs - non-medical: Not on file   Occupational History   • Occupation: Beautician     Comment: Patient resides with    Tobacco Use   • Smoking status: Never Smoker   • Smokeless tobacco: Never Used   Substance and Sexual Activity   • Alcohol use: Yes     Comment: 2018 - Patient reports consumption of 1 alcoholic beverage per month.   • Drug use: No   • Sexual activity: Yes     Partners: Male     Birth control/protection: None   Other Topics Concern   • Not on file   Social History Narrative   • Not on file        Past Surgical History:   Procedure Laterality Date   • COLONOSCOPY N/A 2019    Procedure: COLONOSCOPY;  Surgeon: Howie Rod MD;  Location: [facility] ENDOSCOPY;   "Service: Gastroenterology   • ENDOSCOPY N/A 1/30/2019    Procedure: ESOPHAGOGASTRODUODENOSCOPY URGENT;  Surgeon: Howie Rod MD;  Location: [facility] ENDOSCOPY;  Service: Gastroenterology   • INJECTION OF MEDICATION  02/11/2016    Toradol (1)     • OVARIAN CYST SURGERY Left 05/2017   • WISDOM TOOTH EXTRACTION          Patient Active Problem List   Diagnosis   • PID (acute pelvic inflammatory disease)   • Diverticulitis of large intestine without perforation or abscess without bleeding   • Acquired hypothyroidism   • Diverticulosis of intestine without bleeding   • Morbidly obese (CMS/HCC)   • Generalized abdominal pain   • Abnormal CT scan, colon   • Pelvic pain   • Ovarian cyst, right        Documented Vitals    02/06/19 1559   BP: 100/72   Weight: (!) 159 kg (350 lb 12.8 oz)   Height: 170.2 cm (67\")   PainSc:   1   PainLoc: Abdomen        Body mass index is 54.94 kg/m².    PHYSICAL EXANIMATIONConstitutional: Appears to be in no acute distress;Eyes sclera normal; endocrine system thyroid palpates is normal; pulmonary system lungs clear; cardiovascular system heart regular rate and rhythm;gastrointestinal system abdomen soft nontender morbidly obese active bowel sound;urologic system CVA negative;psychiatric appropriate insight;neurologic gait within normal limits, repeat female reproductive system: External genitalia normal, vagina normal cervix difficult to visualize bimanual examination unrewarding secondary to habitus  Ultrasound today is reviewed it does show a small simple right ovarian cyst about 2 cm  Assessment        Diagnosis Plan   1. Pelvic pain  C-reactive Protein    CBC (No Diff)    Sedimentation Rate   2. Ovarian cyst, right         Can obtain some serologic studies to look for PID which would require intervention at least with antibiotics laparoscopy is a consideration however given her habitus I do not know how well she will tolerate this she says the pain is better and she wants to avoid " intervention at this time       1. Encouraged in diet and exercise.  2. Handouts on depression, hot flashes, exercise, and vitamin use.   3. Follow-up in 6 months.  Follow-up sooner as needed.          This document has been electronically signed by Edgard Worley MD on February 10, 2019 5:07 PM

## 2019-04-10 ENCOUNTER — PATIENT OUTREACH (OUTPATIENT)
Dept: CASE MANAGEMENT | Facility: OTHER | Age: 24
End: 2019-04-10

## 2019-05-14 ENCOUNTER — EPISODE CHANGES (OUTPATIENT)
Dept: CASE MANAGEMENT | Facility: OTHER | Age: 24
End: 2019-05-14

## 2019-06-18 ENCOUNTER — PATIENT OUTREACH (OUTPATIENT)
Dept: CASE MANAGEMENT | Facility: OTHER | Age: 24
End: 2019-06-18

## 2019-06-18 ENCOUNTER — EPISODE CHANGES (OUTPATIENT)
Dept: CASE MANAGEMENT | Facility: OTHER | Age: 24
End: 2019-06-18

## 2019-06-18 NOTE — OUTREACH NOTE
Patient Outreach Note    Patient reports that she is still doing very well with her health. Pt has no open care gaps currently; denies questions or concerns at this time. CC encouraged patient to call as needed, pt v/u. Will graduate from [facility] at this time.    Anne Schuster RN    6/18/2019, 2:29 PM

## 2019-07-26 ENCOUNTER — EPISODE CHANGES (OUTPATIENT)
Dept: CASE MANAGEMENT | Facility: OTHER | Age: 24
End: 2019-07-26

## 2020-12-31 PROCEDURE — U0003 INFECTIOUS AGENT DETECTION BY NUCLEIC ACID (DNA OR RNA); SEVERE ACUTE RESPIRATORY SYNDROME CORONAVIRUS 2 (SARS-COV-2) (CORONAVIRUS DISEASE [COVID-19]), AMPLIFIED PROBE TECHNIQUE, MAKING USE OF HIGH THROUGHPUT TECHNOLOGIES AS DESCRIBED BY CMS-2020-01-R: HCPCS | Performed by: NURSE PRACTITIONER

## (undated) DEVICE — TRAP SXN POLYP QUICKCATCH LF

## (undated) DEVICE — SINGLE-USE BIOPSY FORCEPS: Brand: RADIAL JAW 4

## (undated) DEVICE — BITEBLOCK ENDO W/STRAP 60F A/ LF DISP

## (undated) DEVICE — Device: Brand: DISPOSABLE ELECTROSURGICAL SNARE